# Patient Record
Sex: FEMALE | Race: BLACK OR AFRICAN AMERICAN | NOT HISPANIC OR LATINO | URBAN - METROPOLITAN AREA
[De-identification: names, ages, dates, MRNs, and addresses within clinical notes are randomized per-mention and may not be internally consistent; named-entity substitution may affect disease eponyms.]

---

## 2017-02-01 ENCOUNTER — INPATIENT (INPATIENT)
Facility: HOSPITAL | Age: 57
LOS: 0 days | Discharge: HOME | End: 2017-02-02
Attending: INTERNAL MEDICINE

## 2017-06-27 DIAGNOSIS — R07.9 CHEST PAIN, UNSPECIFIED: ICD-10-CM

## 2017-06-27 DIAGNOSIS — Z90.710 ACQUIRED ABSENCE OF BOTH CERVIX AND UTERUS: ICD-10-CM

## 2017-06-27 DIAGNOSIS — K21.9 GASTRO-ESOPHAGEAL REFLUX DISEASE WITHOUT ESOPHAGITIS: ICD-10-CM

## 2017-06-27 DIAGNOSIS — Z86.73 PERSONAL HISTORY OF TRANSIENT ISCHEMIC ATTACK (TIA), AND CEREBRAL INFARCTION WITHOUT RESIDUAL DEFICITS: ICD-10-CM

## 2017-06-27 DIAGNOSIS — F32.9 MAJOR DEPRESSIVE DISORDER, SINGLE EPISODE, UNSPECIFIED: ICD-10-CM

## 2017-06-27 DIAGNOSIS — H26.9 UNSPECIFIED CATARACT: ICD-10-CM

## 2017-06-27 DIAGNOSIS — H40.9 UNSPECIFIED GLAUCOMA: ICD-10-CM

## 2017-06-27 DIAGNOSIS — R07.89 OTHER CHEST PAIN: ICD-10-CM

## 2017-06-27 DIAGNOSIS — H52.4 PRESBYOPIA: ICD-10-CM

## 2017-06-27 DIAGNOSIS — I10 ESSENTIAL (PRIMARY) HYPERTENSION: ICD-10-CM

## 2019-05-09 ENCOUNTER — RESULT REVIEW (OUTPATIENT)
Age: 59
End: 2019-05-09

## 2019-12-24 ENCOUNTER — EMERGENCY (EMERGENCY)
Facility: HOSPITAL | Age: 59
LOS: 0 days | Discharge: HOME | End: 2019-12-25
Attending: EMERGENCY MEDICINE | Admitting: EMERGENCY MEDICINE
Payer: MEDICAID

## 2019-12-24 VITALS
DIASTOLIC BLOOD PRESSURE: 104 MMHG | SYSTOLIC BLOOD PRESSURE: 194 MMHG | HEART RATE: 85 BPM | RESPIRATION RATE: 18 BRPM | TEMPERATURE: 98 F | WEIGHT: 197.98 LBS
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
## 2019-12-24 DIAGNOSIS — M25.562 PAIN IN LEFT KNEE: ICD-10-CM
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
## 2019-12-24 DIAGNOSIS — M54.2 CERVICALGIA: ICD-10-CM
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
## 2019-12-24 DIAGNOSIS — M25.561 PAIN IN RIGHT KNEE: ICD-10-CM

## 2019-12-24 PROCEDURE — 99283 EMERGENCY DEPT VISIT LOW MDM: CPT

## 2019-12-24 RX ORDER — METHOCARBAMOL 500 MG/1
1000 TABLET, FILM COATED ORAL ONCE
Refills: 0 | Status: COMPLETED | OUTPATIENT
Start: 2019-12-24 | End: 2019-12-24

## 2019-12-24 RX ORDER — KETOROLAC TROMETHAMINE 30 MG/ML
15 SYRINGE (ML) INJECTION ONCE
Refills: 0 | Status: DISCONTINUED | OUTPATIENT
Start: 2019-12-24 | End: 2019-12-24

## 2019-12-24 RX ADMIN — METHOCARBAMOL 1000 MILLIGRAM(S): 500 TABLET, FILM COATED ORAL at 23:33

## 2019-12-24 NOTE — ED ADULT NURSE NOTE - NSIMPLEMENTINTERV_GEN_ALL_ED
Implemented All Universal Safety Interventions:  [place] to call system. Call bell, personal items and telephone within reach. Instruct patient to call for assistance. Room bathroom lighting operational. Non-slip footwear when patient is off stretcher. Physically safe environment: no spills, clutter or unnecessary equipment. Stretcher in lowest position, wheels locked, appropriate side rails in place.

## 2019-12-25 VITALS
OXYGEN SATURATION: 98 % | HEART RATE: 82 BPM | SYSTOLIC BLOOD PRESSURE: 158 MMHG | DIASTOLIC BLOOD PRESSURE: 89 MMHG | TEMPERATURE: 98 F | RESPIRATION RATE: 18 BRPM

## 2019-12-25 PROCEDURE — 73564 X-RAY EXAM KNEE 4 OR MORE: CPT | Mod: 26,50

## 2019-12-25 RX ORDER — IBUPROFEN 200 MG
1 TABLET ORAL
Qty: 20 | Refills: 0
Start: 2019-12-25 | End: 2019-12-29

## 2019-12-25 RX ORDER — METHOCARBAMOL 500 MG/1
2 TABLET, FILM COATED ORAL
Qty: 30 | Refills: 0
Start: 2019-12-25 | End: 2019-12-29

## 2019-12-25 NOTE — ED PROVIDER NOTE - OBJECTIVE STATEMENT
58 y/o F with PMH HTN, has not yet taken her BP meds today presents with b/l knee pain s/p mechanical fall tonight @ 530pm. +constant, throbbing, moderate, non-radiating. R>L . no head injury/LOC/blood thinner use. no other symptoms. +worse with movement, better with rest.

## 2019-12-25 NOTE — ED PROVIDER NOTE - PATIENT PORTAL LINK FT
You can access the FollowMyHealth Patient Portal offered by [facility] by registering at the following website: [email or web address]. By joining [employer]’s FollowMyHealth portal, you will also be able to view your health information using other applications (apps) compatible with our system.

## 2019-12-25 NOTE — ED PROVIDER NOTE - CLINICAL SUMMARY MEDICAL DECISION MAKING FREE TEXT BOX
59yF p/w MSK neck pain and b/l knee pain after fall.  Well appearing, comfortable, xrays w/o apparent traumatic injuries.  No open wounds.  ACE wrap/knee immobilizer provided and pt given meds for MSK pain.  She was counseled on expected worsening on her MSK pain, supportive care, o/p f/u, return precautions.

## 2019-12-25 NOTE — ED PROVIDER NOTE - NSFOLLOWUPINSTRUCTIONS_ED_ALL_ED_FT
How to Use a Knee Immobilizer  A knee immobilizer, also called a knee brace, is used to support and protect an injured or painful knee. You may also have to wear it after knee surgery. A knee brace keeps your knee from moving or bending while it is healing. Wear and remove your knee brace only as told by your doctor.    ImageIn general, your knee brace should:    Have straps, hooks, or tapes that fasten snugly around your leg.  Not feel too tight or too loose.    Follow these instructions at home:  While resting, raise (elevate) your leg above the level of your heart. Pillows can be used for support. Doing this reduces throbbing and helps with healing.  Loosen the brace if your toes tingle or if you notice other symptoms or signs that it is too tight, such as:    Swelling.  Numbness.  Color change in your foot or ankle.  More pain.    Keep the brace clean.  If the brace is not waterproof:    Do not let it get wet.  Cover it with a watertight covering when you take a bath or a shower.    If your doctor says that you may take off (remove) the brace:    Take it off before you take a bath or a shower.  Check for any skin irritation.  Use a towel to dry the area completely before you put the brace back on.    Contact a doctor if:  Your knee brace breaks or needs to be replaced.  You have more pain or swelling in your knee, foot, or ankle.  Your knee brace is not helping.  Your knee brace makes your knee pain worse.  Summary  A knee immobilizer, also called a knee brace, keeps your knee from moving or bending while it is healing.  Different knee braces will have different instructions for use. Follow the instructions from your doctor.  Call your doctor if you have more pain or swelling, if your knee brace breaks, or if it does not help your knee pain.  This information is not intended to replace advice given to you by your health care provider. Make sure you discuss any questions you have with your health care provider.     Cervical Radiculopathy  ImageCervical radiculopathy means that a nerve in the neck is pinched or bruised. This can cause pain or loss of feeling (numbness) that runs from your neck to your arm and fingers.    Follow these instructions at home:  Managing pain     Take over-the-counter and prescription medicines only as told by your doctor.  If directed, put ice on the injured or painful area.    Put ice in a plastic bag.  Place a towel between your skin and the bag.  Leave the ice on for 20 minutes, 2–3 times per day.    If ice does not help, you can try using heat. Take a warm shower or warm bath, or use a heat pack as told by your doctor.  You may try a gentle neck and shoulder massage.  Activity     Rest as needed. Follow instructions from your doctor about any activities to avoid.  Do exercises as told by your doctor or physical therapist.  General instructions     If you were given a soft collar, wear it as told by your doctor.  Use a flat pillow when you sleep.  Keep all follow-up visits as told by your doctor. This is important.  Contact a doctor if:  Your condition does not improve with treatment.  Get help right away if:  Your pain gets worse and is not controlled with medicine.  You lose feeling or feel weak in your hand, arm, face, or leg.  You have a fever.  You have a stiff neck.  You cannot control when you poop or pee (have incontinence).  You have trouble with walking, balance, or talking.  This information is not intended to replace advice given to you by your health care provider. Make sure you discuss any questions you have with your health care provider.

## 2019-12-25 NOTE — ED PROVIDER NOTE - CARE PROVIDER_API CALL
Riki Armstrong (MD)  Orthopaedic Surgery  [address] [place] [address]  Phone: [phone]  Fax: [phone]  Follow Up Time: 1-3 Days

## 2019-12-25 NOTE — ED PROVIDER NOTE - NSFOLLOWUPCLINICS_GEN_ALL_ED_FT
A Family Medicine Doctor  Family Medicine  .  [place]   Phone:   Fax:   Follow Up Time: 1-3 Days    [facility] ([facility])  Rehabilitation  [facility] [address] [place] [address]  Phone: [phone]  Fax:   Follow Up Time: 1-3 Days    [facility] ([facility])  Rehabilitation  [address] [place] [id number]  Phone: [phone]  Fax:   Follow Up Time: 1-3 Days

## 2019-12-25 NOTE — ED PROVIDER NOTE - PHYSICAL EXAMINATION
PHYSICAL EXAM:    GENERAL: Alert, appears stated age, well appearing, non-toxic  SKIN: Warm, pink and dry. MMM.   HEAD: NC, AT, no step offs   EYE: Normal lids/conjunctiva  ENT: Normal hearing, patent oropharynx   NECK: +supple. no TTP.   Pulm: Bilateral BS, normal resp effort, no wheezes, stridor, or retractions  CV: RRR, no M/R/G, 2+and = radial pulses  Abd: soft, non-tender, non-distended.   Mskel: no erythema, cyanosis, edema. +b/l medial knee TTP. FROM. 2+ DP pulses. no spinal TTP   Neuro: AAOx3, no sensory/motor deficits. 5/5 strength throughout. normal gait.

## 2019-12-25 NOTE — ED PROVIDER NOTE - NS ED ROS FT
Review of Systems    Constitutional: (-) fever   Eyes/ENT: (-) vision changes  Cardiovascular: (-) chest pain, (-) syncope (-) palpitations  Respiratory: (-) cough, (-) shortness of breath  Gastrointestinal: (-) vomiting, (-) diarrhea (-) abdominal pain  Musculoskeletal: (-) neck pain, (-) back pain, (+) leg pain  Integumentary: (-) rash, (-) edema  Neurological: (-) headache  Hematologic: (-) easy bruising   Psychiatric: (-) hallucinations  Allergic/Immunologic: (-) pruritus
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
## 2020-01-01 ENCOUNTER — OUTPATIENT (OUTPATIENT)
Dept: OUTPATIENT SERVICES | Facility: HOSPITAL | Age: 60
LOS: 1 days | End: 2020-01-01
Payer: MEDICAID

## 2020-01-03 DIAGNOSIS — Z71.89 OTHER SPECIFIED COUNSELING: ICD-10-CM

## 2020-02-10 DIAGNOSIS — Z76.89 PERSONS ENCOUNTERING HEALTH SERVICES IN OTHER SPECIFIED CIRCUMSTANCES: ICD-10-CM

## 2020-03-31 ENCOUNTER — INPATIENT (INPATIENT)
Facility: HOSPITAL | Age: 60
LOS: 2 days | Discharge: HOME | End: 2020-04-03
Attending: INTERNAL MEDICINE | Admitting: INTERNAL MEDICINE
Payer: MEDICAID

## 2020-03-31 VITALS
SYSTOLIC BLOOD PRESSURE: 124 MMHG | HEART RATE: 92 BPM | OXYGEN SATURATION: 98 % | TEMPERATURE: 99 F | RESPIRATION RATE: 18 BRPM | DIASTOLIC BLOOD PRESSURE: 63 MMHG

## 2020-03-31 DIAGNOSIS — U07.1 COVID-19: ICD-10-CM

## 2020-03-31 DIAGNOSIS — R55 SYNCOPE AND COLLAPSE: ICD-10-CM

## 2020-03-31 DIAGNOSIS — E86.0 DEHYDRATION: ICD-10-CM

## 2020-03-31 DIAGNOSIS — F39 UNSPECIFIED MOOD [AFFECTIVE] DISORDER: ICD-10-CM

## 2020-03-31 DIAGNOSIS — J12.89 OTHER VIRAL PNEUMONIA: ICD-10-CM

## 2020-03-31 DIAGNOSIS — E78.5 HYPERLIPIDEMIA, UNSPECIFIED: ICD-10-CM

## 2020-03-31 DIAGNOSIS — I10 ESSENTIAL (PRIMARY) HYPERTENSION: ICD-10-CM

## 2020-03-31 DIAGNOSIS — I95.9 HYPOTENSION, UNSPECIFIED: ICD-10-CM

## 2020-03-31 DIAGNOSIS — J96.01 ACUTE RESPIRATORY FAILURE WITH HYPOXIA: ICD-10-CM

## 2020-03-31 LAB
ALBUMIN SERPL ELPH-MCNC: 3.4 G/DL — LOW (ref 3.5–5.2)
ALP SERPL-CCNC: 53 U/L — SIGNIFICANT CHANGE UP (ref 30–115)
ALT FLD-CCNC: 19 U/L — SIGNIFICANT CHANGE UP (ref 0–41)
ANION GAP SERPL CALC-SCNC: 11 MMOL/L — SIGNIFICANT CHANGE UP (ref 7–14)
APPEARANCE UR: ABNORMAL
AST SERPL-CCNC: 28 U/L — SIGNIFICANT CHANGE UP (ref 0–41)
BACTERIA # UR AUTO: ABNORMAL
BASOPHILS # BLD AUTO: 0.02 K/UL — SIGNIFICANT CHANGE UP (ref 0–0.2)
BASOPHILS NFR BLD AUTO: 0.1 % — SIGNIFICANT CHANGE UP (ref 0–1)
BILIRUB SERPL-MCNC: 0.6 MG/DL — SIGNIFICANT CHANGE UP (ref 0.2–1.2)
BILIRUB UR-MCNC: NEGATIVE — SIGNIFICANT CHANGE UP
BUN SERPL-MCNC: 13 MG/DL — SIGNIFICANT CHANGE UP (ref 10–20)
CALCIUM SERPL-MCNC: 8.2 MG/DL — LOW (ref 8.5–10.1)
CHLORIDE SERPL-SCNC: 97 MMOL/L — LOW (ref 98–110)
CK SERPL-CCNC: 115 U/L — SIGNIFICANT CHANGE UP (ref 0–225)
CO2 SERPL-SCNC: 25 MMOL/L — SIGNIFICANT CHANGE UP (ref 17–32)
COLOR SPEC: YELLOW — SIGNIFICANT CHANGE UP
CREAT SERPL-MCNC: 1.2 MG/DL — SIGNIFICANT CHANGE UP (ref 0.7–1.5)
DIFF PNL FLD: NEGATIVE — SIGNIFICANT CHANGE UP
EOSINOPHIL # BLD AUTO: 0 K/UL — SIGNIFICANT CHANGE UP (ref 0–0.7)
EOSINOPHIL NFR BLD AUTO: 0 % — SIGNIFICANT CHANGE UP (ref 0–8)
EPI CELLS # UR: 14 /HPF — HIGH (ref 0–5)
GLUCOSE SERPL-MCNC: 211 MG/DL — HIGH (ref 70–99)
GLUCOSE UR QL: SIGNIFICANT CHANGE UP
HCT VFR BLD CALC: 37.5 % — SIGNIFICANT CHANGE UP (ref 37–47)
HGB BLD-MCNC: 13.6 G/DL — SIGNIFICANT CHANGE UP (ref 12–16)
HYALINE CASTS # UR AUTO: 7 /LPF — SIGNIFICANT CHANGE UP (ref 0–7)
IMM GRANULOCYTES NFR BLD AUTO: 2 % — HIGH (ref 0.1–0.3)
KETONES UR-MCNC: ABNORMAL
LDH SERPL L TO P-CCNC: 422 — HIGH (ref 50–242)
LEUKOCYTE ESTERASE UR-ACNC: NEGATIVE — SIGNIFICANT CHANGE UP
LYMPHOCYTES # BLD AUTO: 1.52 K/UL — SIGNIFICANT CHANGE UP (ref 1.2–3.4)
LYMPHOCYTES # BLD AUTO: 8.6 % — LOW (ref 20.5–51.1)
MCHC RBC-ENTMCNC: 28.2 PG — SIGNIFICANT CHANGE UP (ref 27–31)
MCHC RBC-ENTMCNC: 36.3 G/DL — SIGNIFICANT CHANGE UP (ref 32–37)
MCV RBC AUTO: 77.8 FL — LOW (ref 81–99)
MONOCYTES # BLD AUTO: 0.45 K/UL — SIGNIFICANT CHANGE UP (ref 0.1–0.6)
MONOCYTES NFR BLD AUTO: 2.5 % — SIGNIFICANT CHANGE UP (ref 1.7–9.3)
NEUTROPHILS # BLD AUTO: 15.3 K/UL — HIGH (ref 1.4–6.5)
NEUTROPHILS NFR BLD AUTO: 86.8 % — HIGH (ref 42.2–75.2)
NITRITE UR-MCNC: NEGATIVE — SIGNIFICANT CHANGE UP
NRBC # BLD: 0 /100 WBCS — SIGNIFICANT CHANGE UP (ref 0–0)
PH UR: 6 — SIGNIFICANT CHANGE UP (ref 5–8)
PLATELET # BLD AUTO: 124 K/UL — LOW (ref 130–400)
POTASSIUM SERPL-MCNC: 4.6 MMOL/L — SIGNIFICANT CHANGE UP (ref 3.5–5)
POTASSIUM SERPL-SCNC: 4.6 MMOL/L — SIGNIFICANT CHANGE UP (ref 3.5–5)
PROT SERPL-MCNC: 6.7 G/DL — SIGNIFICANT CHANGE UP (ref 6–8)
PROT UR-MCNC: ABNORMAL
RBC # BLD: 4.82 M/UL — SIGNIFICANT CHANGE UP (ref 4.2–5.4)
RBC # FLD: 13.8 % — SIGNIFICANT CHANGE UP (ref 11.5–14.5)
RBC CASTS # UR COMP ASSIST: 4 /HPF — SIGNIFICANT CHANGE UP (ref 0–4)
SODIUM SERPL-SCNC: 133 MMOL/L — LOW (ref 135–146)
SP GR SPEC: 1.02 — SIGNIFICANT CHANGE UP (ref 1.01–1.02)
TROPONIN T SERPL-MCNC: <0.01 NG/ML — SIGNIFICANT CHANGE UP
UROBILINOGEN FLD QL: ABNORMAL
WBC # BLD: 17.65 K/UL — HIGH (ref 4.8–10.8)
WBC # FLD AUTO: 17.65 K/UL — HIGH (ref 4.8–10.8)
WBC UR QL: 11 /HPF — HIGH (ref 0–5)

## 2020-03-31 PROCEDURE — 99285 EMERGENCY DEPT VISIT HI MDM: CPT

## 2020-03-31 PROCEDURE — 93010 ELECTROCARDIOGRAM REPORT: CPT

## 2020-03-31 PROCEDURE — 71045 X-RAY EXAM CHEST 1 VIEW: CPT | Mod: 26

## 2020-03-31 RX ORDER — HYDROXYCHLOROQUINE SULFATE 200 MG
200 TABLET ORAL EVERY 12 HOURS
Refills: 0 | Status: DISCONTINUED | OUTPATIENT
Start: 2020-04-01 | End: 2020-04-03

## 2020-03-31 RX ORDER — NIFEDIPINE 30 MG
60 TABLET, EXTENDED RELEASE 24 HR ORAL DAILY
Refills: 0 | Status: DISCONTINUED | OUTPATIENT
Start: 2020-03-31 | End: 2020-04-01

## 2020-03-31 RX ORDER — CEFTRIAXONE 500 MG/1
1000 INJECTION, POWDER, FOR SOLUTION INTRAMUSCULAR; INTRAVENOUS EVERY 24 HOURS
Refills: 0 | Status: DISCONTINUED | OUTPATIENT
Start: 2020-03-31 | End: 2020-04-03

## 2020-03-31 RX ORDER — SODIUM CHLORIDE 9 MG/ML
500 INJECTION INTRAMUSCULAR; INTRAVENOUS; SUBCUTANEOUS ONCE
Refills: 0 | Status: COMPLETED | OUTPATIENT
Start: 2020-03-31 | End: 2020-03-31

## 2020-03-31 RX ORDER — ACETAMINOPHEN 500 MG
650 TABLET ORAL EVERY 6 HOURS
Refills: 0 | Status: DISCONTINUED | OUTPATIENT
Start: 2020-03-31 | End: 2020-04-03

## 2020-03-31 RX ORDER — ONDANSETRON 8 MG/1
4 TABLET, FILM COATED ORAL ONCE
Refills: 0 | Status: COMPLETED | OUTPATIENT
Start: 2020-03-31 | End: 2020-03-31

## 2020-03-31 RX ORDER — ESCITALOPRAM OXALATE 10 MG/1
20 TABLET, FILM COATED ORAL DAILY
Refills: 0 | Status: DISCONTINUED | OUTPATIENT
Start: 2020-03-31 | End: 2020-04-03

## 2020-03-31 RX ORDER — HYDROXYCHLOROQUINE SULFATE 200 MG
TABLET ORAL
Refills: 0 | Status: DISCONTINUED | OUTPATIENT
Start: 2020-03-31 | End: 2020-04-03

## 2020-03-31 RX ORDER — METOPROLOL TARTRATE 50 MG
25 TABLET ORAL
Refills: 0 | Status: DISCONTINUED | OUTPATIENT
Start: 2020-03-31 | End: 2020-04-01

## 2020-03-31 RX ORDER — CHLORHEXIDINE GLUCONATE 213 G/1000ML
1 SOLUTION TOPICAL
Refills: 0 | Status: DISCONTINUED | OUTPATIENT
Start: 2020-03-31 | End: 2020-04-03

## 2020-03-31 RX ORDER — ATORVASTATIN CALCIUM 80 MG/1
10 TABLET, FILM COATED ORAL AT BEDTIME
Refills: 0 | Status: DISCONTINUED | OUTPATIENT
Start: 2020-03-31 | End: 2020-04-03

## 2020-03-31 RX ORDER — HYDROXYCHLOROQUINE SULFATE 200 MG
400 TABLET ORAL EVERY 12 HOURS
Refills: 0 | Status: COMPLETED | OUTPATIENT
Start: 2020-03-31 | End: 2020-04-01

## 2020-03-31 RX ORDER — ENOXAPARIN SODIUM 100 MG/ML
40 INJECTION SUBCUTANEOUS DAILY
Refills: 0 | Status: DISCONTINUED | OUTPATIENT
Start: 2020-03-31 | End: 2020-04-03

## 2020-03-31 RX ORDER — CEFTRIAXONE 500 MG/1
1000 INJECTION, POWDER, FOR SOLUTION INTRAMUSCULAR; INTRAVENOUS ONCE
Refills: 0 | Status: COMPLETED | OUTPATIENT
Start: 2020-03-31 | End: 2020-03-31

## 2020-03-31 RX ADMIN — Medication 650 MILLIGRAM(S): at 21:11

## 2020-03-31 RX ADMIN — Medication 400 MILLIGRAM(S): at 20:57

## 2020-03-31 RX ADMIN — ATORVASTATIN CALCIUM 10 MILLIGRAM(S): 80 TABLET, FILM COATED ORAL at 20:57

## 2020-03-31 RX ADMIN — SODIUM CHLORIDE 500 MILLILITER(S): 9 INJECTION INTRAMUSCULAR; INTRAVENOUS; SUBCUTANEOUS at 21:09

## 2020-03-31 RX ADMIN — CEFTRIAXONE 100 MILLIGRAM(S): 500 INJECTION, POWDER, FOR SOLUTION INTRAMUSCULAR; INTRAVENOUS at 19:06

## 2020-03-31 RX ADMIN — ONDANSETRON 4 MILLIGRAM(S): 8 TABLET, FILM COATED ORAL at 20:37

## 2020-03-31 NOTE — ED PROVIDER NOTE - PHYSICAL EXAMINATION
VITAL SIGNS: I have reviewed nursing notes and confirm.  CONSTITUTIONAL: appears stated age, no acute distress  SKIN: Warm dry, normal skin turgor  HEAD: NCAT  EYES: EOMI, PERRLA, no scleral icterus  ENT: Moist mucous membranes, normal pharynx   NECK: Supple; non tender.  CARD: RRR, no murmurs, rubs or gallops  RESP: clear to ausculation b/l.  No rales, rhonchi, or wheezing.  ABD: soft, + BS, non-tender, non-distended, no rebound or guarding.  EXT: Full ROM, no bony tenderness, no pedal edema, no calf tenderness  NEURO: normal motor. normal sensory. CN II-XII intact. Cerebellar testing normal.   PSYCH: Cooperative, appropriate.

## 2020-03-31 NOTE — H&P ADULT - NSHPPHYSICALEXAM_GEN_ALL_CORE
GEN: NAD, comfortable  CARDIO: RRR, no m/r/g  RESP: CTAB, no w/r/r  ABD: soft, NT/ND, +BS  EXT: no edema, pp b/l  NEURO: AAOx3, grossly normal Vital Signs Last 24 Hrs  T(C): 37.1 (01 Apr 2020 00:50), Max: 38.8 (31 Mar 2020 20:47)  T(F): 98.8 (01 Apr 2020 00:50), Max: 101.8 (31 Mar 2020 20:47)  HR: 100 (31 Mar 2020 20:47) (92 - 100)  BP: 101/64 (31 Mar 2020 20:47) (101/64 - 124/63)  BP(mean): 77 (31 Mar 2020 20:47) (77 - 77)  RR: 18 (31 Mar 2020 20:47) (17 - 20)  SpO2: 96% (31 Mar 2020 20:47) (93% - 98%)    GEN: NAD, comfortable  CARDIO: RRR, no m/r/g  RESP: CTAB, no w/r/r  ABD: soft, NT/ND, +BS  EXT: no edema, pp b/l  NEURO: AAOx3, grossly normal

## 2020-03-31 NOTE — H&P ADULT - NSICDXPASTMEDICALHX_GEN_ALL_CORE_FT
PAST MEDICAL HISTORY:  Hypertension, unspecified type PAST MEDICAL HISTORY:  HLD (hyperlipidemia)     Hypertension, unspecified type     Mood disorder

## 2020-03-31 NOTE — ED ADULT NURSE NOTE - CHIEF COMPLAINT QUOTE
Pt has syncopal episode today. Pt had second syncopal episode when EMS arrived and was hypotensive. Pt also c/o cough.

## 2020-03-31 NOTE — ED PROVIDER NOTE - CARE PLAN
Principal Discharge DX:	Syncope  Secondary Diagnosis:	Viral illness Principal Discharge DX:	Syncope  Secondary Diagnosis:	Viral illness  Secondary Diagnosis:	UTI (urinary tract infection)

## 2020-03-31 NOTE — ED PROVIDER NOTE - NS ED ROS FT
Constitutional: See HPI.  Eyes: No visual changes, eye pain or discharge. No Photophobia  ENMT: No hearing changes, pain, discharge or infections. No neck pain or stiffness. No limited ROM  Cardiac: No SOB or edema. No chest pain with exertion.  Respiratory: see hpi   GI: see hpi   : No dysuria, frequency or burning. No Discharge  MS: No myalgia, muscle weakness, joint pain or back pain.  Neuro: see hpi   Skin: No skin rash.  Except as documented in the HPI, all other systems are negative.

## 2020-03-31 NOTE — H&P ADULT - NSHPLABSRESULTS_GEN_ALL_CORE
13.6   17.65 )-----------( 124      ( 31 Mar 2020 16:00 )             37.5     03-31    133<L>  |  97<L>  |  13  ----------------------------<  211<H>  4.6   |  25  |  1.2    Ca    8.2<L>      31 Mar 2020 16:00    TPro  6.7  /  Alb  3.4<L>  /  TBili  0.6  /  DBili  x   /  AST  28  /  ALT  19  /  AlkPhos  53  0331    Urinalysis Basic - ( 31 Mar 2020 18:10 )    Color: Yellow / Appearance: Slightly Turbid / S.016 / pH: x  Gluc: x / Ketone: Small  / Bili: Negative / Urobili: 3 mg/dL   Blood: x / Protein: 30 mg/dL / Nitrite: Negative   Leuk Esterase: Negative / RBC: 4 /HPF / WBC 11 /HPF   Sq Epi: x / Non Sq Epi: 14 /HPF / Bacteria: Moderate    Lactate Trend    CARDIAC MARKERS ( 31 Mar 2020 16:00 )  x     / <0.01 ng/mL / 115 U/L / x     / x

## 2020-03-31 NOTE — ED PROVIDER NOTE - CLINICAL SUMMARY MEDICAL DECISION MAKING FREE TEXT BOX
Pt with h/o htn, hld with generalized weakness/malaise for 4 days, today lightheaded and syncopized - no injury from syncope.  + cough x 2 days.  O2 sat 92-94% on RA.  no increased WOB.  cxr - b/l hazy infiltrates.  covid swab sent.  wbc 17K.  ua + for UTI, given iv abx.  admitted for likely covid, hypoxia.

## 2020-03-31 NOTE — ED PROVIDER NOTE - ATTENDING CONTRIBUTION TO CARE
58 y/o female with h/o htn, hld, in ER for eval after episode of syncope earlier today. Pt states she felt nauseated and lightheaded and then passed out, witnessed by family.  Denies hitting head.  No seizure activity noted.  Pt c/o generalized weakness for past few days with loss of appetite.  + non-productive cough x 2 days.  Denies CP/SOB.  no f/c.  no abd pain.  no v/d.  no urinary symptoms.  No HA currently.   PE - nad, nc/at, eomi, perrl, op - clear, no c-spine tenderness, no resp distress, + speaking full sentences, cta b/l, no w/r/r, rrr, abd- soft, nt/nd, nabs, from x 4, A&O x 3, cn 2-12 intact, no motor/sensory deficits.  -check labs, cxr, ekg, nasal swab for covid.

## 2020-03-31 NOTE — ED ADULT NURSE NOTE - NSIMPLEMENTINTERV_GEN_ALL_ED
Implemented All Fall with Harm Risk Interventions:  [place] to call system. Call bell, personal items and telephone within reach. Instruct patient to call for assistance. Room bathroom lighting operational. Non-slip footwear when patient is off stretcher. Physically safe environment: no spills, clutter or unnecessary equipment. Stretcher in lowest position, wheels locked, appropriate side rails in place. Provide visual cue, wrist band, yellow gown, etc. Monitor gait and stability. Monitor for mental status changes and reorient to person, place, and time. Review medications for side effects contributing to fall risk. Reinforce activity limits and safety measures with patient and family. Provide visual clues: red socks.

## 2020-03-31 NOTE — H&P ADULT - ASSESSMENT
58 yo F with PMH of HTN and DLD presented after syncopal episode. Reports she has been having loss of appetite, loss of smell and malaise over past 4 days.    #) Cough + malaise + anosmia 2/2 suspected COVID-19  - CXR = b/l interstitial opacities  - COVID swab - sent, pending  - patient also with leukocytosis and +ve UA - will treat for UTI for now w/ Rocephin  - Tylenol PRN  - will start course of Plaquenil since patient hypoxic  - titrate supplemental oxygen  - f/u CRP + pro gabriella    #) HTN    #) DLD    DVT ppx: Lovenox subQ  Diet: DASH  Activity: AAT  Code status: FULL  Dispo: pending 58 yo F with PMH of HTN and DLD presented after syncopal episode. Also endorses loss of appetite, chills, and dry cough for past 2-3 days.    #) Cough + malaise 2/2 suspected COVID-19  - CXR = b/l interstitial opacities  - COVID swab - sent, pending  - patient also with leukocytosis and +ve UA - will treat as cystitis for now w/ Rocephin  - Tylenol PRN  - will start course of Plaquenil since patient hypoxic  - titrate supplemental oxygen  - f/u CRP + pro gabriella    #) Syncope?  - witnessed event, no post-ictal confusion  - possibly vagal mediated event 2/2 dehydration and underlying infection  - c/w tele monitoring, check orthostats  - cannot order echo at this time    #) HTN -     #) DLD -     DVT ppx: Lovenox subQ  Diet: DASH  Activity: AAT  Code status: FULL  Dispo: pending 60 yo F with PMH of HTN and DLD presented after syncopal episode. Also endorses loss of appetite, chills, and dry cough for past 2-3 days.    #) Cough + malaise 2/2 suspected COVID-19  - CXR = b/l interstitial opacities  - COVID swab - sent, pending  - Tylenol PRN  - will start course of Plaquenil since patient hypoxic  - titrate supplemental oxygen  - f/u CRP + pro gabriella    #) Syncope?  - witnessed event, no post-ictal confusion  - possibly vagal mediated event 2/2 dehydration/decreased PO intake and underlying infection  - c/w tele monitoring, check orthostats  - cannot order echo at this time    #) Acute cystitis  - +ve UA, leukocytosis w/ L-shift, does report urinary frequency  - will treat w/ Rocephin for now    #) HTN - c/w Procardia + Clonidine + BB (home meds)    #) DLD - c/w statin    #) Mood disorder - c/w SSRI    DVT ppx: Lovenox subQ  Diet: DASH  Activity: AAT  Code status: FULL  Dispo: pending 58 yo F with PMH of HTN and DLD presented after syncopal episode. Also endorses loss of appetite, chills, and dry cough for past 2-3 days.    #) Cough + malaise 2/2 suspected COVID-19  - CXR = b/l interstitial opacities  - COVID swab - sent, pending  - Tylenol PRN  - will start course of Plaquenil since patient hypoxic  - titrate supplemental oxygen  - f/u CRP + pro gabriella    #) Syncope?  - witnessed event, no post-ictal confusion  - possibly vagal mediated event 2/2 dehydration/decreased PO intake and underlying infection  - c/w tele monitoring, check orthostats  - cannot order echo at this time    #) UTI  - +ve UA, leukocytosis w/ L-shift, does report urinary frequency  - will treat w/ Rocephin for now    #) HTN - c/w Procardia + Clonidine + BB (home meds)    #) DLD - c/w statin    #) Mood disorder - c/w SSRI    DVT ppx: Lovenox subQ  Diet: DASH  Activity: AAT  Code status: FULL  Dispo: pending

## 2020-03-31 NOTE — H&P ADULT - HISTORY OF PRESENT ILLNESS
60 yo F with PMH of HTN and DLD presented after syncopal episode. Reports she has been having loss of appetite, loss of smell and malaise over past 4 days. Also endorses cough for past 2 days. Today reports she felt weak and lightheaded and proceeded to pass out. Denies head strike, event was witnessed by kids. No seizure like activity reported. Denies fevers, chills, N/V/C/D, or other complaints. No known COVID-19 contacts. No recent travel. 58 yo F with PMH of HTN and DLD presented after syncopal episode. Reports she was at home when she felt weak and lightheaded, proceeded to pass out. Denies head strike, event was witnessed by kids. No seizure like activity reported. Also endorses malaise, chills, and dry cough over past 2 days. Denies fevers, N/V/C/D, changes in taste or smell, dysuria, or other complaints. No known COVID-19 contacts. No recent travel. States she has history of syncope in past last event 2 years ago.
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
## 2020-03-31 NOTE — ED ADULT TRIAGE NOTE - CHIEF COMPLAINT QUOTE
Pt has syncopal episode today. Pt had second syncopal episode when EMS arrived and was hypotensive. Pt denies fever, cough. Pt has syncopal episode today. Pt had second syncopal episode when EMS arrived and was hypotensive. Pt also c/o cough.

## 2020-03-31 NOTE — H&P ADULT - NSHPPOADEEPVENOUSTHROMB_GEN_A_CORE
Patient:   NAVJOT MARTIN            MRN: [id number]            FIN: [id number]              Age:   80 years     Sex:  FEMALE     :  42   Associated Diagnoses:   None   Author:   YOVANI NIELSON         OPERATIVE REPORT       Primary Care Physician      Physician Name:  CRISTINO MOLINA  Specialty :  FAMILY PRACTICE    Consulting Physicians     Physician Name:  MATY FUENTES Speciality:  PHYSICAL MEDICINE/REHAB Consult Reason:  PMR Evaluation         DATE OF SURGERY: 2019    PREOPERATIVE DIAGNOSIS(ES): Grade 2–3 spondylolisthesis L5-S1, adult degenerative scoliosis, sagittal and coronal plane imbalance with medically intractable back and lower extremity pain    POSTOPERATIVE DIAGNOSIS(ES): Grade 2–3 spondylolisthesis L5-S1, adult degenerative scoliosis, sagittal and coronal plane imbalance with medically intractable back and lower extremity pain    OPERATION: Placement of segmental instrumentation T10-S1, placement of bilateral pelvic fixation, T10-S2/ilium posterior spinal fusion/arthrodesis using locally harvested autograft and morcellized allograft, L4-L5 transforaminal lumbar interbody fusion including placement of intervertebral mechanical device, L3-L4, L4-L5, and L5-S1 Giron-Arnold/posterior column osteotomies for generation of lordosis, L3-L5 laminectomy for cauda equina  decompression, T9-T10 spinous process tethering, use of fluoroscopy, use of navigation    SURGEON: Yovani Pizano MD    ASSISTANT: Cayetano Crisostomo NP who assisted with positioning and closure    ANESTHESIA: General endotracheal anesthesia    COMPLICATIONS: None    ESTIMATED BLOOD LOSS: Per Anesthesia flowsheet    INSTRUMENTATION: NuVasive reline    INDICATIONS: 80-year-old female with severe back and lower extremity pain.  She describes severe L5 and S1 radiculopathies with some evidence of neurogenic claudication.  She has had a previous laminectomy in her lower lumbar spine as well as in her upper lumbar  spine for separate instances of pain and weakness.  She is developed a progressive coronal and sagittal plane imbalance as was evidenced on her scoliosis x-rays.  She also has evidence  of severe stenosis in the setting of a grade 2–3 spondylolisthesis at L5-S1.  She tried a variety of nonoperative interventions without meaningful success.  After her failure of nonoperative therapy we discussed the possibility of surgical intervention.  We discussed the need to not only address her focal pathology in her lower lumbar spine but also address her scoliosis and create a durable long-term construct.  We discussed in detail the  risks, benefits, and alternatives to surgical intervention.  We discussed the medical, technical, and neurologic challenges associated with such an operation and answered all of the questions today.  After discussing all these things in detail the patient and her family requested that we proceed and therefore her informed consent was obtained.    DESCRIPTION OF PROCEDURE: The patient was identified in the preoperative holding area and all appropriate consents were signed.  She was then taken to the operating room by the anesthesia staff and placed under general tracheal anesthesia without difficulty.  All appropriate vascular access was then obtained.  She was then placed in a [facility] tongs and placed prone onto the open OSI Deven table.  She was placed into 10 pounds of  craniocervical traction.  All bony prominences were padded.  We then prepped and draped in usual sterile fashion.  We then performed a timeout and verify the antibiotics have been administered and we had baseline neuro monitoring data.  Everyone agreed to proceed.  We then made an incision in the midline and performed a subperiosteal dissection all the way out to the lateral aspect of the transverse processes from T10 down to the sacrum and  pelvis.  We then cleansed and denuded the spine as best as possible.  We then  turned our attention to placement of segmental instrumentation.  We began placing our pedicle screws using an open freehand technique.  We wanted to place screws at L3 and above in this fashion and then used navigation for the distal construct given the challenging degenerated aberrant anatomy.  We then a 5 to start point using anatomic landmarks in greatest our point  using a high-speed bur.  We then cannulated the pedicle using a curved Lenke probe and then verified intraosseous cannulation using a blunt hole probe.  We then aspirated bone marrow to mix with allograft.  We then placed the appropriate size screws.  We then attached the reference arc and brought O arm in the field.  We reviewed the O arm images for all of our placed instrumentation and then used navigation to place the remaining  instrumentation.  From L4 to the sacrum and pelvis we again identified the start point using anatomic landmarks and verify that with navigation.  We created*point used a high-speed bur and then cannulated the pedicle using a navigated tap.  We then verified intraosseous cannulation using a blunt hole probe and then placed the appropriate size screw again using navigation.  Once all the screws were in from T10-S1 we then turned our attention to  our pelvic fixation.  We wanted to place a S2 AI screws and therefore we then identified the start point using anatomic landmarks and then again verified that with navigation.  We then cannulated the sacrum and ilium using a navigated tap and again verified intraosseous cannulation.  We then placed the appropriate size screw.  We did this first on the right side and ultimate the left side.  We then turned our attention to our decompression as  well as our osteotomies.  We began with the Smith-Arnold/posterior column osteotomies at L5-S1.  We used a high-speed bur to first create the inferior facetectomy and ultimately removed the superior facet using a high-speed bur.  We did this  first at L5-S1 and then at L4-L5 and then at L3-L4 releasing each of these levels in order to mobilize the spine as best as possible.  Once the Giron-Arnold osteotomies were complete we then turned our  attention to our decompression.  We used a combination of a high-speed bur as well as a Leksell Man in order to remove the laminas of L3, L4, and L5.  We wanted to completely decompress the cauda equina and verify that the lateral recess was nice and wide open.  We are able to follow out the nerve roots nicely to make sure that they were open not only in the central canal, lateral recess, as well as foramen.  Once those were nicely  decompressed we knew that we had decompressed the cauda equina nicely.  We kept all that autograft for arthrodesis.  We then distracted slightly at the L4-L5 level from the left side and identified the L4-L5 disc space.  We protected the exiting L4 nerve root as well as the traversing L5 nerve root and incised the disc using a 15 blade knife.  We then completed our discectomy using combination of a disc shaver as well as a curette and a  pituitary rongeur.  We then prepped the endplates as best as possible and trialed the appropriate size graft.  We then placed additional autograft in the anterior aspect of this space and impacted that as best as possible.  We then filled the titanium cage with autograft and then placed at the disc space.  We positioned it as best as possible.  We then backfilled the disc space using additional autograft.  We then remove the distraction and  turned our attention to our correction.  We placed a temporary iris on the left side from L2-L4 and distracted slightly to correct some of the scoliosis.  We then measured and contoured a iris for the right side.  We then used a combination of segmental reduction and cantilever bending in order to correct some of the scoliosis and attached the iris.  We then cut and contoured a iris for the left side after remove the  temporary iris on the left side.   We then used a combination of segmental reduction and cantilever bending as well as some in situ contouring in order to attach the iris from T10 down to the sacrum and pelvis on the left side as well.  We provisionally tightened everything using set screws and ultimately a torque/counter torque device.  We then brought x-ray into the field and reviewed our long cassette films.  Overall we are happy with the alignment and all of her  instrumentation and final tightened everything using a torque/counter torque device.  We then cleansed the wound as best as possible using pulse lavage irrigation.  We then obtained hemostasis and turned our attention to decortication.  We decorticated the posterior lateral gutter as well as the remaining lamina from T10 down to S2/ilium.  We then placed all of our autograft and allograft as well as BMP in the posterior lateral gutter in order  to optimize her conditions for ultimate fusion.  We then placed an additional iris on the right side in order to add stiffness across the lumbosacral junction or lower lumbar construct.  We then wanted to minimize the risk of proximal junctional kyphosis and therefore we added a T9-T10 spinous process tether.  We identified the base of the T9 spinous process and drilled a hole through it.  We then passed the tether through it and then ultimately  attached it to the iris connectors and tensioned it appropriately.  We final tightened that and then turned our attention to closure.  We obtained hemostasis and then placed 1 g of vancomycin powder in the wound.  We placed a deep drain and tunneled out the skin.  We have treated the soft tissue with quarter percent Marcaine with epinephrine for postoperative pain control.  We then closed the wound in layers including the muscle layer and the  fascial layer using interrupted 0 Vicryl sutures and ultimately the dermal using interrupted 2-0 Vicryl sutures.  We then approximate  the skin using a running subcuticular stitch and sealed the skin using Dermabond and Steri-Strips.  We secured the drain in place using 3-0 nylon suture in place sterile dressing on the wound.  The patient was then placed supine on the hospital bed and the Mckeon-Wells tongs removed.  Overall the patient  tolerated well without difficulty.    All sponge, needle, and instrument counts were correct throughout procedure.    All neuromonitoring data was stable throughout this procedure.              This dictation was created using Dragon voice recognition software and thus transcription errors or variance may occur.    LATESHA MACK M.D.   no

## 2020-03-31 NOTE — ED PROVIDER NOTE - OBJECTIVE STATEMENT
60 y/o F pmhx of HTN, HLD p/w syncope. Patient states has had loss of appetite and sense of taste along with malaise x 4 days, weakness and lightheadedness w/ preceding nausea prior to synopsizing at home, denies hitting head, witnessed by kids, no convulsing state as per patient, no urinary incontinence or tongue biting, no fevers/chills, admits to non-productive cough x 2 days. no other complaints. no chest pain or shortness of breath.

## 2020-03-31 NOTE — H&P ADULT - ATTENDING COMMENTS
60 YO F with a PMH of HTN and DLD who presents to the hospital with a c/o experiencing a witnessed syncopal episode at home. + LOC, - head trauma. No seizure activity. Endorses preceding symptom of light-headedness, denies any palpitations, CP, focal weakness, or headaches. Associated with non-productive cough and fevers. In the ED, a Chest X-Ray showed B/L reticulonodular airspace opacities. Pt was febrile (101.3) with hypoxia, placed on NC. Started on IV ABXs (Ceftriaxone). Isolated and COVID19 swab sent.     Physical exam shows pt in NAD. VSS, currently afebrile, not hypoxic on 3L NC. A&Ox3. Non-focal neuro exam. Muscle strength/sensation intact. CTA B/L with no W/C/R. RRR, no M/G/R. ABD is soft and non-tender, normoactive BSs. LEs without swelling. No rashes. Labs and radiology as above. QTc 470    Fevers + Cough likely due to viral respiratory syndrome, needs COVID19 rule out. - Recent travel. - COVID19 contact. Admit to COVID19 isolation unit. COVID19 swab sent. Isolate pt. Send CRP and procal. Trend CBC, CK, and LFTs. Does not currently qualify for hydroxychloroquine (need CXR changes + acute hypoxic respiratory failure requiring supplemental O2). IV ABxs (Ceftriaxone/Azithro). Vitamin C/Zinc. IVFs (LR). APAP PRN. Anti-tussives PRN. Supplemental O2 PRN. C/w statins, if LFTs are not > 75. No NSAIDs. Prone patient regularly. Serial EKGs.     Thrombocytopenia, from above. Pt denies any signs of bleeding. Management as above.     Syncopal episode, likely vasovagal is setting of viral syndrome. Doubt cardiac/seizure. Telem admit. Serial cardiac enzymes and EKGs. Fall precautions.     WALI vs CKD2, likely pre-renal; Doubt ATN. Send UA, urine lytes, urine eosinophils, serum phos, and trend BMP. IVFs (LR). Monitor urinary out-put.     Asymptomatic bacteriuria. Will treat for now.     Hx of HTN and DLD. Restart home meds, hold nephrotoxic agents. GI and DVT PPX. Inform PCP of pt's admission to hospital. Fall precautions. Rest as per above note.

## 2020-04-01 LAB
CRP SERPL-MCNC: 17.54 MG/DL — HIGH (ref 0–0.4)
CULTURE RESULTS: SIGNIFICANT CHANGE UP
FERRITIN SERPL-MCNC: 948 NG/ML — HIGH (ref 15–150)
PROCALCITONIN SERPL-MCNC: 0.77 NG/ML — HIGH (ref 0.02–0.1)
SARS-COV-2 RNA SPEC QL NAA+PROBE: DETECTED
SPECIMEN SOURCE: SIGNIFICANT CHANGE UP

## 2020-04-01 PROCEDURE — 99223 1ST HOSP IP/OBS HIGH 75: CPT | Mod: AI

## 2020-04-01 RX ORDER — FAMOTIDINE 10 MG/ML
20 INJECTION INTRAVENOUS DAILY
Refills: 0 | Status: DISCONTINUED | OUTPATIENT
Start: 2020-04-01 | End: 2020-04-03

## 2020-04-01 RX ADMIN — Medication 200 MILLIGRAM(S): at 20:39

## 2020-04-01 RX ADMIN — FAMOTIDINE 20 MILLIGRAM(S): 10 INJECTION INTRAVENOUS at 06:35

## 2020-04-01 RX ADMIN — Medication 400 MILLIGRAM(S): at 09:37

## 2020-04-01 RX ADMIN — ESCITALOPRAM OXALATE 20 MILLIGRAM(S): 10 TABLET, FILM COATED ORAL at 12:47

## 2020-04-01 RX ADMIN — CEFTRIAXONE 100 MILLIGRAM(S): 500 INJECTION, POWDER, FOR SOLUTION INTRAMUSCULAR; INTRAVENOUS at 18:00

## 2020-04-01 RX ADMIN — ATORVASTATIN CALCIUM 10 MILLIGRAM(S): 80 TABLET, FILM COATED ORAL at 20:39

## 2020-04-01 RX ADMIN — Medication 25 MILLIGRAM(S): at 04:47

## 2020-04-01 NOTE — ED ADULT NURSE REASSESSMENT NOTE - COMFORT CARE
assisted to bedpan/then cleaned after and changed with no linens provided. assisted to bedpan/then cleaned after and changed with linens provided.

## 2020-04-01 NOTE — ED ADULT NURSE REASSESSMENT NOTE - NS ED NURSE REASSESS COMMENT FT1
Hourly rounding performed with enhanced supervision.
Pt due for Metoprolol 25mg, Nifidipine 60mg and Clonidine 0.1mg PO at 6am with BP of 117/72 P-88. Pt was brought in yesterday after synopsizing twice. Once at home then in ambulance with BP in the 70s systolically. MD 300Chantal said to hold clonidine and nifidipine. Will continue to monitor.
Pt with vitals 6:50am of BP 89/54, P 83, R-17 98% on 2L nc so legs elevated and MD [id number] called who said they would access the patients chart about potential orders. Will continue to monitor. After elevating legs BP up to 104/55 P-84.
Oxygen

## 2020-04-01 NOTE — PROGRESS NOTE ADULT - SUBJECTIVE AND OBJECTIVE BOX
BRAYDEN MARTINO 59y Female  MRN#: [id number]   CODE STATUS:___full_____      SUBJECTIVE  Patient is a 59y old Female who presents with a chief complaint of syncope (31 Mar 2020 18:33)  Currently admitted to medicine with the primary diagnosis of Syncope  Hospital course has been complicated by hypotension  Today is hospital day 1d, and this morning she is feeling much better, had mild SOB but feels 'improved' and reports no acute complaints.   Overnight pt's BP was noted to be low, was given a bolus. This AM I held all BP meds.       OBJECTIVE  PAST MEDICAL & SURGICAL HISTORY  Mood disorder  HLD (hyperlipidemia)  Hypertension, unspecified type    ALLERGIES:  No Known Allergies    MEDICATIONS:  STANDING MEDICATIONS  atorvastatin 10 milliGRAM(s) Oral at bedtime  cefTRIAXone   IVPB 1000 milliGRAM(s) IV Intermittent every 24 hours  chlorhexidine 4% Liquid 1 Application(s) Topical <User Schedule>  enoxaparin Injectable 40 milliGRAM(s) SubCutaneous daily  escitalopram 20 milliGRAM(s) Oral daily  famotidine    Tablet 20 milliGRAM(s) Oral daily  hydroxychloroquine 200 milliGRAM(s) Oral every 12 hours  hydroxychloroquine   Oral     PRN MEDICATIONS  acetaminophen   Tablet .. 650 milliGRAM(s) Oral every 6 hours PRN  acetaminophen   Tablet .. 650 milliGRAM(s) Oral every 6 hours PRN      VITAL SIGNS: Last 24 Hours  T(C): 37.7 (2020 06:50), Max: 38.8 (31 Mar 2020 20:47)  T(F): 99.8 (2020 06:50), Max: 101.8 (31 Mar 2020 20:47)  HR: 83 (2020 07:10) (83 - 100)  BP: 104/55 (2020 07:10) (89/54 - 124/63)  BP(mean): 72 (2020 07:10) (72 - 79)  RR: 18 (2020 07:10) (16 - 20)  SpO2: 98% (2020 07:10) (93% - 99%)    LABS:                        13.6   17.65 )-----------( 124      ( 31 Mar 2020 16:00 )             37.5     03-31    133<L>  |  97<L>  |  13  ----------------------------<  211<H>  4.6   |  25  |  1.2    Ca    8.2<L>      31 Mar 2020 16:00    TPro  6.7  /  Alb  3.4<L>  /  TBili  0.6  /  DBili  x   /  AST  28  /  ALT  19  /  AlkPhos  53        Urinalysis Basic - ( 31 Mar 2020 18:10 )    Color: Yellow / Appearance: Slightly Turbid / S.016 / pH: x  Gluc: x / Ketone: Small  / Bili: Negative / Urobili: 3 mg/dL   Blood: x / Protein: 30 mg/dL / Nitrite: Negative   Leuk Esterase: Negative / RBC: 4 /HPF / WBC 11 /HPF   Sq Epi: x / Non Sq Epi: 14 /HPF / Bacteria: Moderate        Creatine Kinase, Serum: 115 U/L (20 @ 16:00)  Troponin T, Serum: <0.01 ng/mL (20 @ 16:00)      CARDIAC MARKERS ( 31 Mar 2020 16:00 )  x     / <0.01 ng/mL / 115 U/L / x     / x          RADIOLOGY:      PHYSICAL EXAM:    GENERAL: NAD, well-developed AAF comfortable, AAOx3  HEENT:  Atraumatic, Normocephalic. EOMI, PERRLA, conjunctiva and sclera clear, No JVD  PULMONARY: Clear to auscultation bilaterally; No wheeze/crackles appreciated though exam but limited by habitus/positioning  CARDIOVASCULAR: Regular rate and rhythm; No murmurs  GASTROINTESTINAL: Soft, Nontender, Nondistended; Bowel sounds normoactive  MUSCULOSKELETAL:  2+ Peripheral Pulses, No periph edema  NEUROLOGY: non-focal  SKIN: No rashes      ADMISSION SUMMARY  Patient is a 59y old Female who presents with a chief complaint of syncope (31 Mar 2020 18:33)  Currently admitted to medicine with the primary diagnosis of Syncope  60 yo F with PMH of HTN and DLD presented after syncopal episode. Reports she was at home when she felt weak and lightheaded, proceeded to pass out. Denies head strike, event was witnessed by kids. No seizure like activity reported. Also endorses malaise, chills, and dry cough over past 2 days. Denies fevers, N/V/C/D, changes in taste or smell, dysuria, or other complaints. No known COVID-19 contacts. No recent travel. States she has history of syncope in past last event 2 years ago.      ASSESSMENT & PLAN  60 yo F with PMH of HTN and DLD presented after syncopal episode. Also endorses loss of appetite, chills, and dry cough for past 2-3 days.    #) Cough + malaise due to suspected COVID-19  #) other possible underlying infection  - CXR: b/l interstitial opacities  - no hypoxia, 99% on RA but on 2L for pt comfort 99%.   - however WBC 17 w/ left shift, no leukopenia  - UA neg (no leuko/nitrile)  - f/u COVID19  - f/u BCx, UCx  - Tylenol PRN  - started on plaquanil & ceftriaxone (possible bacterial CAP or UTI?)  - QTc 470, EP consult placed  - titrate supplemental oxygen  - CRP 17.5, procal 0.77,   - ID consult    #) Syncope, witnessed  #) Hypotension   - possibly vagal mediated event due to dehydration/decreased PO intake and underlying infection, orthostatic hypotension   - BP now 80/40s overnight, s/p bolus  - holding CLONIDINE, metoprolol, nifedipine (watch for rebound HTN)      #) HTN  - hypotensive; HOLD Procardia + Clonidine + BB (home meds)    #) DLD - c/w statin  #) Mood disorder - c/w SSRI    #MISC  - DVT ppx: Lovenox subQ  - Diet: DASH  - Activity: AAT  - Code status: FULL    (x) Discussion with patient and/or family regarding goals of care  (x) Discussed Case and Plan with Medical Attending, Name: Yomi BRAYDEN MARTINO 59y Female  MRN#: [id number]   CODE STATUS:___full_____      SUBJECTIVE  Patient is a 59y old Female who presents with a chief complaint of syncope (31 Mar 2020 18:33)  Currently admitted to medicine with the primary diagnosis of Syncope  Hospital course has been complicated by hypotension  Today is hospital day 1d, and this morning she is feeling much better, had mild SOB but feels 'improved' and reports no acute complaints.   Overnight pt's BP was noted to be low, was given a bolus. This AM I held all BP meds.       OBJECTIVE  PAST MEDICAL & SURGICAL HISTORY  Mood disorder  HLD (hyperlipidemia)  Hypertension, unspecified type    ALLERGIES:  No Known Allergies    MEDICATIONS:  STANDING MEDICATIONS  atorvastatin 10 milliGRAM(s) Oral at bedtime  cefTRIAXone   IVPB 1000 milliGRAM(s) IV Intermittent every 24 hours  chlorhexidine 4% Liquid 1 Application(s) Topical <User Schedule>  enoxaparin Injectable 40 milliGRAM(s) SubCutaneous daily  escitalopram 20 milliGRAM(s) Oral daily  famotidine    Tablet 20 milliGRAM(s) Oral daily  hydroxychloroquine 200 milliGRAM(s) Oral every 12 hours  hydroxychloroquine   Oral     PRN MEDICATIONS  acetaminophen   Tablet .. 650 milliGRAM(s) Oral every 6 hours PRN  acetaminophen   Tablet .. 650 milliGRAM(s) Oral every 6 hours PRN      VITAL SIGNS: Last 24 Hours  T(C): 37.7 (2020 06:50), Max: 38.8 (31 Mar 2020 20:47)  T(F): 99.8 (2020 06:50), Max: 101.8 (31 Mar 2020 20:47)  HR: 83 (2020 07:10) (83 - 100)  BP: 104/55 (2020 07:10) (89/54 - 124/63)  BP(mean): 72 (2020 07:10) (72 - 79)  RR: 18 (2020 07:10) (16 - 20)  SpO2: 98% (2020 07:10) (93% - 99%)    LABS:                        13.6   17.65 )-----------( 124      ( 31 Mar 2020 16:00 )             37.5     03-31    133<L>  |  97<L>  |  13  ----------------------------<  211<H>  4.6   |  25  |  1.2    Ca    8.2<L>      31 Mar 2020 16:00    TPro  6.7  /  Alb  3.4<L>  /  TBili  0.6  /  DBili  x   /  AST  28  /  ALT  19  /  AlkPhos  53        Urinalysis Basic - ( 31 Mar 2020 18:10 )    Color: Yellow / Appearance: Slightly Turbid / S.016 / pH: x  Gluc: x / Ketone: Small  / Bili: Negative / Urobili: 3 mg/dL   Blood: x / Protein: 30 mg/dL / Nitrite: Negative   Leuk Esterase: Negative / RBC: 4 /HPF / WBC 11 /HPF   Sq Epi: x / Non Sq Epi: 14 /HPF / Bacteria: Moderate        Creatine Kinase, Serum: 115 U/L (20 @ 16:00)  Troponin T, Serum: <0.01 ng/mL (20 @ 16:00)      CARDIAC MARKERS ( 31 Mar 2020 16:00 )  x     / <0.01 ng/mL / 115 U/L / x     / x          RADIOLOGY:  There are bilateral reticular and airspace opacities at both lung bases.     PHYSICAL EXAM:    GENERAL: NAD, well-developed AAF comfortable, AAOx3  HEENT:  Atraumatic, Normocephalic. EOMI, PERRLA, conjunctiva and sclera clear, No JVD  PULMONARY: Clear to auscultation bilaterally; No wheeze/crackles appreciated though exam but limited by habitus/positioning  CARDIOVASCULAR: Regular rate and rhythm; No murmurs  GASTROINTESTINAL: Soft, Nontender, Nondistended; Bowel sounds normoactive  MUSCULOSKELETAL:  2+ Peripheral Pulses, No periph edema  NEUROLOGY: non-focal  SKIN: No rashes    ADMISSION SUMMARY  Patient is a 59y old Female who presents with a chief complaint of syncope (31 Mar 2020 18:33)  Currently admitted to medicine with the primary diagnosis of Syncope  58 yo F with PMH of HTN and DLD presented after syncopal episode. Reports she was at home when she felt weak and lightheaded, proceeded to pass out. Denies head strike, event was witnessed by kids. No seizure like activity reported. Also endorses malaise, chills, and dry cough over past 2 days. Denies fevers, N/V/C/D, changes in taste or smell, dysuria, or other complaints. No known COVID-19 contacts. No recent travel. States she has history of syncope in past last event 2 years ago.      ASSESSMENT & PLAN  58 yo F with PMH of HTN and DLD presented after syncopal episode. Also endorses loss of appetite, chills, and dry cough for past 2-3 days.    #) Cough + malaise due to suspected COVID-19  #) other possible underlying infection  - CXR: b/l interstitial opacities  - no hypoxia, 99% on RA but on 2L for pt comfort 99%.   - however WBC 17 w/ left shift, no leukopenia  - UA neg (no leuko/nitrile)  - f/u COVID19  - f/u BCx, UCx  - Tylenol PRN  - started on plaquanil & ceftriaxone (possible bacterial CAP or UTI?)  - QTc 470, EP consult placed  - titrate supplemental oxygen  - CRP 17.5, procal 0.77,   - ID consult    #) Syncope, witnessed  #) Hypotension   - possibly vagal mediated event due to dehydration/decreased PO intake and underlying infection, orthostatic hypotension   - BP now 80/40s overnight, s/p bolus  - holding CLONIDINE, metoprolol, nifedipine (watch for rebound HTN)      #) HTN  - hypotensive; HOLD Procardia + Clonidine + BB (home meds)    #) DLD - c/w statin  #) Mood disorder - c/w SSRI    #MISC  - DVT ppx: Lovenox subQ  - Diet: DASH  - Activity: AAT  - Code status: FULL    (x) Discussion with patient and/or family regarding goals of care    Attending Attestation   Pt has been seen and examined. She is doing better than yesterday and this am.  She is on oxygen and breathing comfortably 3L NC 93% at Rest.   Admitted for SARS-CoV-2 Infection with Acute Hypoxic Respiratory Failure and UTI  Hypotension potentiated by BP Meds / Dehydration and Acute Infection additively / Doubt Shock from COVID19  c/w Hydroxychloroquine as above / Oxygen support / Ceftriaxone / f/u Ucxs  f/u EKG and watch closely QT Interval as she's on upper limit of normal for female. Check one in AM.   Pt eating well no need for IVF   Hold BP Meds / Monitor BP   Triple Isolation Precautions     Vital Signs Last 24 Hrs  T(C): 37.1 (2020 20:47), Max: 37.7 (2020 06:50)  T(F): 98.8 (2020 20:47), Max: 99.8 (2020 06:50)  HR: 82 (2020 20:47) (80 - 88)  BP: 120/74 (2020 20:47) (89/54 - 120/74)  BP(mean): 72 (2020 07:10) (72 - 79)  RR: 18 (2020 20:47) (16 - 18)  SpO2: 98% (2020 20:47) (98% - 99%)    Dispo: Acute / BRAYDEN MARTINO 59y Female  MRN#: [id number]   CODE STATUS:___full_____      SUBJECTIVE  Patient is a 59y old Female who presents with a chief complaint of syncope (31 Mar 2020 18:33)  Currently admitted to medicine with the primary diagnosis of Syncope  Hospital course has been complicated by hypotension  Today is hospital day 1d, and this morning she is feeling much better, had mild SOB but feels 'improved' and reports no acute complaints.   Overnight pt's BP was noted to be low, was given a bolus. This AM I held all BP meds.       OBJECTIVE  PAST MEDICAL & SURGICAL HISTORY  Mood disorder  HLD (hyperlipidemia)  Hypertension, unspecified type    ALLERGIES:  No Known Allergies    MEDICATIONS:  STANDING MEDICATIONS  atorvastatin 10 milliGRAM(s) Oral at bedtime  cefTRIAXone   IVPB 1000 milliGRAM(s) IV Intermittent every 24 hours  chlorhexidine 4% Liquid 1 Application(s) Topical <User Schedule>  enoxaparin Injectable 40 milliGRAM(s) SubCutaneous daily  escitalopram 20 milliGRAM(s) Oral daily  famotidine    Tablet 20 milliGRAM(s) Oral daily  hydroxychloroquine 200 milliGRAM(s) Oral every 12 hours  hydroxychloroquine   Oral     PRN MEDICATIONS  acetaminophen   Tablet .. 650 milliGRAM(s) Oral every 6 hours PRN  acetaminophen   Tablet .. 650 milliGRAM(s) Oral every 6 hours PRN      VITAL SIGNS: Last 24 Hours  T(C): 37.7 (2020 06:50), Max: 38.8 (31 Mar 2020 20:47)  T(F): 99.8 (2020 06:50), Max: 101.8 (31 Mar 2020 20:47)  HR: 83 (2020 07:10) (83 - 100)  BP: 104/55 (2020 07:10) (89/54 - 124/63)  BP(mean): 72 (2020 07:10) (72 - 79)  RR: 18 (2020 07:10) (16 - 20)  SpO2: 98% (2020 07:10) (93% - 99%)    LABS:                        13.6   17.65 )-----------( 124      ( 31 Mar 2020 16:00 )             37.5     03-31    133<L>  |  97<L>  |  13  ----------------------------<  211<H>  4.6   |  25  |  1.2    Ca    8.2<L>      31 Mar 2020 16:00    TPro  6.7  /  Alb  3.4<L>  /  TBili  0.6  /  DBili  x   /  AST  28  /  ALT  19  /  AlkPhos  53        Urinalysis Basic - ( 31 Mar 2020 18:10 )    Color: Yellow / Appearance: Slightly Turbid / S.016 / pH: x  Gluc: x / Ketone: Small  / Bili: Negative / Urobili: 3 mg/dL   Blood: x / Protein: 30 mg/dL / Nitrite: Negative   Leuk Esterase: Negative / RBC: 4 /HPF / WBC 11 /HPF   Sq Epi: x / Non Sq Epi: 14 /HPF / Bacteria: Moderate        Creatine Kinase, Serum: 115 U/L (20 @ 16:00)  Troponin T, Serum: <0.01 ng/mL (20 @ 16:00)      CARDIAC MARKERS ( 31 Mar 2020 16:00 )  x     / <0.01 ng/mL / 115 U/L / x     / x          RADIOLOGY:  There are bilateral reticular and airspace opacities at both lung bases.     PHYSICAL EXAM:    GENERAL: NAD, well-developed AAF comfortable, AAOx3  HEENT:  Atraumatic, Normocephalic. EOMI, PERRLA, conjunctiva and sclera clear, No JVD  PULMONARY: Clear to auscultation bilaterally; No wheeze/crackles appreciated though exam but limited by habitus/positioning  CARDIOVASCULAR: Regular rate and rhythm; No murmurs  GASTROINTESTINAL: Soft, Nontender, Nondistended; Bowel sounds normoactive  MUSCULOSKELETAL:  2+ Peripheral Pulses, No periph edema  NEUROLOGY: non-focal  SKIN: No rashes    ADMISSION SUMMARY  Patient is a 59y old Female who presents with a chief complaint of syncope (31 Mar 2020 18:33)  Currently admitted to medicine with the primary diagnosis of Syncope  60 yo F with PMH of HTN and DLD presented after syncopal episode. Reports she was at home when she felt weak and lightheaded, proceeded to pass out. Denies head strike, event was witnessed by kids. No seizure like activity reported. Also endorses malaise, chills, and dry cough over past 2 days. Denies fevers, N/V/C/D, changes in taste or smell, dysuria, or other complaints. No known COVID-19 contacts. No recent travel. States she has history of syncope in past last event 2 years ago.      ASSESSMENT & PLAN  60 yo F with PMH of HTN and DLD presented after syncopal episode. Also endorses loss of appetite, chills, and dry cough for past 2-3 days.    #) Cough + malaise due to suspected COVID-19  #) other possible underlying infection  - CXR: b/l interstitial opacities  - no hypoxia, 99% on RA but on 2L for pt comfort 99%.   - however WBC 17 w/ left shift, no leukopenia  - UA neg (no leuko/nitrile)  - f/u COVID19  - f/u BCx, UCx  - Tylenol PRN  - started on plaquanil & ceftriaxone (possible bacterial CAP or UTI?)  - QTc 470, EP consult placed  - titrate supplemental oxygen  - CRP 17.5, procal 0.77,   - ID consult    #) Syncope, witnessed  #) Hypotension   - possibly vagal mediated event due to dehydration/decreased PO intake and underlying infection, orthostatic hypotension   - BP now 80/40s overnight, s/p bolus  - holding CLONIDINE, metoprolol, nifedipine (watch for rebound HTN)      #) HTN  - hypotensive; HOLD Procardia + Clonidine + BB (home meds)    #) DLD - c/w statin  #) Mood disorder - c/w SSRI    #MISC  - DVT ppx: Lovenox subQ  - Diet: DASH  - Activity: AAT  - Code status: FULL    (x) Discussion with patient and/or family regarding goals of care    Attending Attestation   Pt has been seen and examined. She is doing better than yesterday and this am.  She is on oxygen and breathing comfortably 3L NC 93% at Rest.   Admitted for SARS-CoV-2 Infection with Acute Hypoxic Respiratory Failure and UTI  Syncope 2/2 Hypotension potentiated by BP Meds / Dehydration and Acute Infection additively / Doubt Shock from COVID19  c/w Hydroxychloroquine as above / Oxygen support / Ceftriaxone / f/u Ucxs  f/u EKG and watch closely QT Interval as she's on upper limit of normal for female. Check one in AM.   Pt eating well no need for IVF   Hold BP Meds / Monitor BP   Triple Isolation Precautions     Vital Signs Last 24 Hrs  T(C): 37.1 (2020 20:47), Max: 37.7 (2020 06:50)  T(F): 98.8 (2020 20:47), Max: 99.8 (2020 06:50)  HR: 82 (2020 20:47) (80 - 88)  BP: 120/74 (2020 20:47) (89/54 - 120/74)  BP(mean): 72 (2020 07:10) (72 - 79)  RR: 18 (2020 20:47) (16 - 18)  SpO2: 98% (2020 20:47) (98% - 99%)    Dispo: Acute /

## 2020-04-02 ENCOUNTER — TRANSCRIPTION ENCOUNTER (OUTPATIENT)
Age: 60
End: 2020-04-02

## 2020-04-02 LAB
ALBUMIN SERPL ELPH-MCNC: 2.8 G/DL — LOW (ref 3.5–5.2)
ALP SERPL-CCNC: 43 U/L — SIGNIFICANT CHANGE UP (ref 30–115)
ALT FLD-CCNC: 16 U/L — SIGNIFICANT CHANGE UP (ref 0–41)
ANION GAP SERPL CALC-SCNC: 12 MMOL/L — SIGNIFICANT CHANGE UP (ref 7–14)
AST SERPL-CCNC: 23 U/L — SIGNIFICANT CHANGE UP (ref 0–41)
BASOPHILS # BLD AUTO: 0.02 K/UL — SIGNIFICANT CHANGE UP (ref 0–0.2)
BASOPHILS NFR BLD AUTO: 0.2 % — SIGNIFICANT CHANGE UP (ref 0–1)
BILIRUB SERPL-MCNC: 0.4 MG/DL — SIGNIFICANT CHANGE UP (ref 0.2–1.2)
BUN SERPL-MCNC: 17 MG/DL — SIGNIFICANT CHANGE UP (ref 10–20)
CALCIUM SERPL-MCNC: 8.6 MG/DL — SIGNIFICANT CHANGE UP (ref 8.5–10.1)
CHLORIDE SERPL-SCNC: 104 MMOL/L — SIGNIFICANT CHANGE UP (ref 98–110)
CO2 SERPL-SCNC: 27 MMOL/L — SIGNIFICANT CHANGE UP (ref 17–32)
CREAT SERPL-MCNC: 1.2 MG/DL — SIGNIFICANT CHANGE UP (ref 0.7–1.5)
EOSINOPHIL # BLD AUTO: 0 K/UL — SIGNIFICANT CHANGE UP (ref 0–0.7)
EOSINOPHIL NFR BLD AUTO: 0 % — SIGNIFICANT CHANGE UP (ref 0–8)
GLUCOSE SERPL-MCNC: 108 MG/DL — HIGH (ref 70–99)
HCT VFR BLD CALC: 32.9 % — LOW (ref 37–47)
HGB BLD-MCNC: 11.7 G/DL — LOW (ref 12–16)
IMM GRANULOCYTES NFR BLD AUTO: 1.2 % — HIGH (ref 0.1–0.3)
LYMPHOCYTES # BLD AUTO: 1.85 K/UL — SIGNIFICANT CHANGE UP (ref 1.2–3.4)
LYMPHOCYTES # BLD AUTO: 15.3 % — LOW (ref 20.5–51.1)
MAGNESIUM SERPL-MCNC: 2.2 MG/DL — SIGNIFICANT CHANGE UP (ref 1.8–2.4)
MCHC RBC-ENTMCNC: 27.7 PG — SIGNIFICANT CHANGE UP (ref 27–31)
MCHC RBC-ENTMCNC: 35.6 G/DL — SIGNIFICANT CHANGE UP (ref 32–37)
MCV RBC AUTO: 78 FL — LOW (ref 81–99)
MONOCYTES # BLD AUTO: 0.44 K/UL — SIGNIFICANT CHANGE UP (ref 0.1–0.6)
MONOCYTES NFR BLD AUTO: 3.6 % — SIGNIFICANT CHANGE UP (ref 1.7–9.3)
NEUTROPHILS # BLD AUTO: 9.62 K/UL — HIGH (ref 1.4–6.5)
NEUTROPHILS NFR BLD AUTO: 79.7 % — HIGH (ref 42.2–75.2)
NRBC # BLD: 0 /100 WBCS — SIGNIFICANT CHANGE UP (ref 0–0)
PLATELET # BLD AUTO: 166 K/UL — SIGNIFICANT CHANGE UP (ref 130–400)
POTASSIUM SERPL-MCNC: 3.9 MMOL/L — SIGNIFICANT CHANGE UP (ref 3.5–5)
POTASSIUM SERPL-SCNC: 3.9 MMOL/L — SIGNIFICANT CHANGE UP (ref 3.5–5)
PROT SERPL-MCNC: 5.6 G/DL — LOW (ref 6–8)
RBC # BLD: 4.22 M/UL — SIGNIFICANT CHANGE UP (ref 4.2–5.4)
RBC # FLD: 13.9 % — SIGNIFICANT CHANGE UP (ref 11.5–14.5)
SODIUM SERPL-SCNC: 143 MMOL/L — SIGNIFICANT CHANGE UP (ref 135–146)
WBC # BLD: 12.07 K/UL — HIGH (ref 4.8–10.8)
WBC # FLD AUTO: 12.07 K/UL — HIGH (ref 4.8–10.8)

## 2020-04-02 PROCEDURE — 99232 SBSQ HOSP IP/OBS MODERATE 35: CPT

## 2020-04-02 RX ORDER — METOPROLOL TARTRATE 50 MG
1 TABLET ORAL
Qty: 0 | Refills: 0 | DISCHARGE

## 2020-04-02 RX ORDER — METOPROLOL TARTRATE 50 MG
25 TABLET ORAL
Refills: 0 | Status: DISCONTINUED | OUTPATIENT
Start: 2020-04-02 | End: 2020-04-03

## 2020-04-02 RX ORDER — ONDANSETRON 8 MG/1
4 TABLET, FILM COATED ORAL ONCE
Refills: 0 | Status: COMPLETED | OUTPATIENT
Start: 2020-04-02 | End: 2020-04-02

## 2020-04-02 RX ORDER — NIFEDIPINE 30 MG
1 TABLET, EXTENDED RELEASE 24 HR ORAL
Qty: 0 | Refills: 0 | DISCHARGE

## 2020-04-02 RX ORDER — METOPROLOL TARTRATE 50 MG
25 TABLET ORAL
Refills: 0 | Status: DISCONTINUED | OUTPATIENT
Start: 2020-04-02 | End: 2020-04-02

## 2020-04-02 RX ADMIN — ONDANSETRON 4 MILLIGRAM(S): 8 TABLET, FILM COATED ORAL at 17:17

## 2020-04-02 RX ADMIN — ENOXAPARIN SODIUM 40 MILLIGRAM(S): 100 INJECTION SUBCUTANEOUS at 12:00

## 2020-04-02 RX ADMIN — CEFTRIAXONE 100 MILLIGRAM(S): 500 INJECTION, POWDER, FOR SOLUTION INTRAMUSCULAR; INTRAVENOUS at 17:54

## 2020-04-02 RX ADMIN — ESCITALOPRAM OXALATE 20 MILLIGRAM(S): 10 TABLET, FILM COATED ORAL at 12:59

## 2020-04-02 RX ADMIN — ATORVASTATIN CALCIUM 10 MILLIGRAM(S): 80 TABLET, FILM COATED ORAL at 22:11

## 2020-04-02 RX ADMIN — Medication 25 MILLIGRAM(S): at 17:54

## 2020-04-02 RX ADMIN — Medication 200 MILLIGRAM(S): at 07:27

## 2020-04-02 RX ADMIN — Medication 200 MILLIGRAM(S): at 19:47

## 2020-04-02 RX ADMIN — FAMOTIDINE 20 MILLIGRAM(S): 10 INJECTION INTRAVENOUS at 12:59

## 2020-04-02 NOTE — DISCHARGE NOTE PROVIDER - HOSPITAL COURSE
60 yo F with PMH of HTN and DLD presented after syncopal episode. Reports she was at home when she felt weak and lightheaded, proceeded to pass out. Denies head strike, event was witnessed by kids. No seizure like activity reported. Also endorses malaise, chills, and dry cough over past 2 days. Denies fevers, N/V/C/D, changes in taste or smell, dysuria, or other complaints. No known COVID-19 contacts. No recent travel. States she has history of syncope in past last event 2 years ago. While here pt was noted to be hypotensive so anti-HTN meds held.         Patient was successfully weaned off 2L NC to RA and SaO2 96%. Pt received plquanil while here but had no real respiratory complaints. For d/c home to family. 58 yo F with PMH of HTN and DLD presented after syncopal episode. Reports she was at home when she felt weak and lightheaded, proceeded to pass out. Denies head strike, event was witnessed by kids. No seizure like activity reported. Also endorses malaise, chills, and dry cough over past 2 days. Denies fevers, N/V/C/D, changes in taste or smell, dysuria, or other complaints. No known COVID-19 contacts. No recent travel. States she has history of syncope in past last event 2 years ago.         While here pt was noted to be hypotensive so anti-HTN meds held. BP improved off meds. Tested positive for COVID. Patient was successfully weaned off 2L NC to RA and SaO2 96%. She is saturating 92% on RA on ambulation. She will be discharged home to complete a 5 day course of Plaquenil.

## 2020-04-02 NOTE — DISCHARGE NOTE PROVIDER - NSDCMRMEDTOKEN_GEN_ALL_CORE_FT
atorvastatin 10 mg oral tablet: 1 tab(s) orally once a day  cloNIDine 0.1 mg oral tablet: 1 tab(s) orally 2 times a day  escitalopram 20 mg oral tablet: 1 tab(s) orally once a day atorvastatin 10 mg oral tablet: 1 tab(s) orally once a day  escitalopram 20 mg oral tablet: 1 tab(s) orally once a day  hydroxychloroquine 200 mg oral tablet: 1 tab(s) orally 2 times a day   metoprolol tartrate 25 mg oral tablet: 1 tab(s) orally 2 times a day

## 2020-04-02 NOTE — DISCHARGE NOTE PROVIDER - NSDCCPCAREPLAN_GEN_ALL_CORE_FT
PRINCIPAL DISCHARGE DIAGNOSIS  Diagnosis: Viral pneumonia  Assessment and Plan of Treatment: You were tested POSITIVE FOR COVID while in hospital.  - Upon returning home, please try to ISOLATE away from other family members. Stay in a separate room. Use a separate bathroom if possible. Keep 6 feet away and minimize prolonged interaction.  - Keep surfaces clean and wiped down at home.  - Please hydrate well, if you have no appetite - please try to keep your intake up with applesauce/soups or other mild things to avoid dehydration and low blood sugars.   - You can use Tylenol for fevers (avoid NSAIDs such as motrin).   - No further antibiotics or anti-viral medications are prescribed at this time. Please note changes in blood pressure meds.  - If you develop any of the following: WORSENING SHORTNESS OF BREATH, high fevers, non-stop diarrhea or vomiting or other concerning symptoms - seek immediate medical attention. Do not wait. Call 911 if you need emergent help.  - Follow up with your primary medical doctor as needed.      SECONDARY DISCHARGE DIAGNOSES  Diagnosis: Hypertension  Assessment and Plan of Treatment: You have a history of high blood pressure however while in hospital your blood pressure was low so your medications were held.  - We recommend STOPPING nifedipine, metoprolol for now and continuing only with clonidine.   - If possible please obtain a home blood pressure machine (available at most pharmacy at a reasonable cost), and monitor. If your blood pressure is rising, systolic (top #) >140 or diastolic (lower #) >100 please call your primary doctor right away to help guide restarting your blood pressure medications.  - If you feel light headed or find that your blood pressure is LOW, we recommend holding clonidine as well.   Please follow up with your primary to guide when to restart your home medications.    Diagnosis: Syncope  Assessment and Plan of Treatment: You had a event during which you collapssed, possibly due to low pressure which may have been related to the COVID19 virus.  - Please monitor your blood pressure at home if possible. When you stand up, do so slowly to allow your body to eliquibrate.  - If you develop worsening lightheadedness, chest pain, dizziness, palpitations or other such symptoms, seek IMMEDIATE medical attention. PRINCIPAL DISCHARGE DIAGNOSIS  Diagnosis: Viral pneumonia  Assessment and Plan of Treatment: You were tested POSITIVE FOR COVID while in hospital.  - Upon returning home, please try to ISOLATE away from other family members. Stay in a separate room. Use a separate bathroom if possible. Keep 6 feet away and minimize prolonged interaction.  - Keep surfaces clean and wiped down at home.  - Please hydrate well, if you have no appetite - please try to keep your intake up with applesauce/soups or other mild things to avoid dehydration and low blood sugars.   - You can use Tylenol for fevers (avoid NSAIDs such as motrin).   - No further antibiotics or anti-viral medications are prescribed at this time. Please note changes in blood pressure meds.  - If you develop any of the following: WORSENING SHORTNESS OF BREATH, high fevers, non-stop diarrhea or vomiting or other concerning symptoms - seek immediate medical attention. Do not wait. Call 911 if you need emergent help.  - Follow up with your primary medical doctor as needed.      SECONDARY DISCHARGE DIAGNOSES  Diagnosis: Hypertension  Assessment and Plan of Treatment: You have a history of high blood pressure however while in hospital your blood pressure was low so your medications were held.  - We recommend STOPPING nifedipine, clonidine for now and continuing only with metoprolol.   - If possible please obtain a home blood pressure machine (available at most pharmacy at a reasonable cost), and monitor. If your blood pressure is rising, systolic (top #) >140 or diastolic (lower #) >100 please call your primary doctor right away to help guide restarting your blood pressure medications.  - If you feel light headed or find that your blood pressure is LOW, we recommend holding clonidine as well.   Please follow up with your primary doctor to guide when to restart your home medications.    Diagnosis: Syncope  Assessment and Plan of Treatment: You had a event during which you collapssed, possibly due to low pressure which may have been related to the COVID19 virus.  - Please monitor your blood pressure at home if possible. When you stand up, do so slowly to allow your body to eliquibrate.  - If you develop worsening lightheadedness, chest pain, dizziness, palpitations or other such symptoms, seek IMMEDIATE medical attention.

## 2020-04-02 NOTE — PROGRESS NOTE ADULT - SUBJECTIVE AND OBJECTIVE BOX
BRAYDEN MARTINO  59y  Female      Patient is a 59y old  Female who presents with a chief complaint of syncope (2020 12:28)      INTERVAL HPI/OVERNIGHT EVENTS:  She feels better, no cough or SOB, no other complaints.   Vital Signs Last 24 Hrs  T(C): 37.1 (2020 20:47), Max: 37.3 (2020 16:33)  T(F): 98.8 (2020 20:47), Max: 99.2 (2020 16:33)  HR: 82 (2020 20:47) (80 - 82)  BP: 120/74 (2020 20:47) (107/56 - 120/74)  BP(mean): --  RR: 18 (2020 20:47) (18 - 18)  SpO2: 98% (2020 20:47) (98% - 98%)            Consultant(s) Notes Reviewed:  [x ] YES  [ ] NO          MEDICATIONS  (STANDING):  atorvastatin 10 milliGRAM(s) Oral at bedtime  cefTRIAXone   IVPB 1000 milliGRAM(s) IV Intermittent every 24 hours  chlorhexidine 4% Liquid 1 Application(s) Topical <User Schedule>  cloNIDine 0.1 milliGRAM(s) Oral every 12 hours  enoxaparin Injectable 40 milliGRAM(s) SubCutaneous daily  escitalopram 20 milliGRAM(s) Oral daily  famotidine    Tablet 20 milliGRAM(s) Oral daily  hydroxychloroquine   Oral   hydroxychloroquine 200 milliGRAM(s) Oral every 12 hours    MEDICATIONS  (PRN):  acetaminophen   Tablet .. 650 milliGRAM(s) Oral every 6 hours PRN Temp greater or equal to 38C (100.4F)  acetaminophen   Tablet .. 650 milliGRAM(s) Oral every 6 hours PRN Mild Pain (1 - 3)      LABS                          11.7   12.07 )-----------( 166      ( 2020 06:47 )             32.9     04-02    143  |  104  |  17  ----------------------------<  108<H>  3.9   |  27  |  1.2    Ca    8.6      2020 06:47  Mg     2.2     04-02    TPro  5.6<L>  /  Alb  2.8<L>  /  TBili  0.4  /  DBili  x   /  AST  23  /  ALT  16  /  AlkPhos  43  04-02      Urinalysis Basic - ( 31 Mar 2020 18:10 )    Color: Yellow / Appearance: Slightly Turbid / S.016 / pH: x  Gluc: x / Ketone: Small  / Bili: Negative / Urobili: 3 mg/dL   Blood: x / Protein: 30 mg/dL / Nitrite: Negative   Leuk Esterase: Negative / RBC: 4 /HPF / WBC 11 /HPF   Sq Epi: x / Non Sq Epi: 14 /HPF / Bacteria: Moderate        Lactate Trend    CARDIAC MARKERS ( 31 Mar 2020 16:00 )  x     / <0.01 ng/mL / 115 U/L / x     / x          CAPILLARY BLOOD GLUCOSE          Culture - Urine (collected 20 @ 18:10)  Source: .Urine Clean Catch (Midstream)  Final Report (20 @ 22:33):    >=3 organisms. Probable collection contamination.        RADIOLOGY & ADDITIONAL TESTS:    Imaging Personally Reviewed:  [ ] YES  [ ] NO    HEALTH ISSUES - PROBLEM Dx:        PHYSICAL EXAM:  GENERAL: NAD, well-developed  HEAD:  Atraumatic, Normocephalic  EYES: EOMI, PERRLA, conjunctiva and sclera clear  NECK: Supple, No JVD  CHEST/LUNG: Clear to auscultation bilaterally; No wheeze  HEART: Regular rate and rhythm; S1 S2  ABDOMEN: Soft, Nontender, Nondistended; Bowel sounds present  EXTREMITIES:  2+ Peripheral Pulses, No clubbing, cyanosis, or edema  PSYCH: AAOx3  NEUROLOGY: non-focal  SKIN: No rashes or lesions

## 2020-04-02 NOTE — PROGRESS NOTE ADULT - SUBJECTIVE AND OBJECTIVE BOX
BRAYDEN MARTINO 59y Female  MRN#: [id number]   CODE STATUS:___full_____      SUBJECTIVE  Patient is a 59y old Female who presents with a chief complaint of syncope (31 Mar 2020 18:33)  Currently admitted to medicine with the primary diagnosis of Syncope  Hospital course has been complicated by hypotension  Today is hospital day 2d, and this morning she is feeling much better and reports no acute complaints. Off O2, 96%.   Overnight pt's BP is improving, though BP meds still held.    OBJECTIVE  PAST MEDICAL & SURGICAL HISTORY  Mood disorder  HLD (hyperlipidemia)  Hypertension, unspecified type    ALLERGIES:  No Known Allergies    MEDICATIONS:  STANDING MEDICATIONS  atorvastatin 10 milliGRAM(s) Oral at bedtime  cefTRIAXone   IVPB 1000 milliGRAM(s) IV Intermittent every 24 hours  chlorhexidine 4% Liquid 1 Application(s) Topical <User Schedule>  cloNIDine 0.1 milliGRAM(s) Oral every 12 hours  enoxaparin Injectable 40 milliGRAM(s) SubCutaneous daily  escitalopram 20 milliGRAM(s) Oral daily  famotidine    Tablet 20 milliGRAM(s) Oral daily  hydroxychloroquine 200 milliGRAM(s) Oral every 12 hours  hydroxychloroquine   Oral     PRN MEDICATIONS  acetaminophen   Tablet .. 650 milliGRAM(s) Oral every 6 hours PRN  acetaminophen   Tablet .. 650 milliGRAM(s) Oral every 6 hours PRN      VITAL SIGNS: Last 24 Hours  T(C): 36.6 (2020 13:01), Max: 37.3 (2020 16:33)  T(F): 97.8 (2020 13:01), Max: 99.2 (2020 16:33)  HR: 79 (2020 13:01) (79 - 82)  BP: 115/70 (2020 13:01) (107/56 - 120/74)  BP(mean): --  RR: 19 (2020 13:01) (18 - 19)  SpO2: 92% (2020 13:01) (92% - 98%)    LABS:                        11.7   12.07 )-----------( 166      ( 2020 06:47 )             32.9     04-02    143  |  104  |  17  ----------------------------<  108<H>  3.9   |  27  |  1.2    Ca    8.6      2020 06:47  Mg     2.2     04-02    TPro  5.6<L>  /  Alb  2.8<L>  /  TBili  0.4  /  DBili  x   /  AST  23  /  ALT  16  /  AlkPhos  43  04-02      Urinalysis Basic - ( 31 Mar 2020 18:10 )    Color: Yellow / Appearance: Slightly Turbid / S.016 / pH: x  Gluc: x / Ketone: Small  / Bili: Negative / Urobili: 3 mg/dL   Blood: x / Protein: 30 mg/dL / Nitrite: Negative   Leuk Esterase: Negative / RBC: 4 /HPF / WBC 11 /HPF   Sq Epi: x / Non Sq Epi: 14 /HPF / Bacteria: Moderate            Culture - Urine (collected 31 Mar 2020 18:10)  Source: .Urine Clean Catch (Midstream)  Final Report (2020 22:33):    >=3 organisms. Probable collection contamination.      CARDIAC MARKERS ( 31 Mar 2020 16:00 )  x     / <0.01 ng/mL / 115 U/L / x     / x          RADIOLOGY:      PHYSICAL EXAM:    GENERAL: NAD, well-developed AAF comfortable, AAOx3  HEENT:  Atraumatic, Normocephalic. EOMI, PERRLA, conjunctiva and sclera clear, No JVD  PULMONARY: Clear to auscultation bilaterally; No wheeze/crackles appreciated though exam but limited by habitus/positioning  CARDIOVASCULAR: Regular rate and rhythm; No murmurs  GASTROINTESTINAL: Soft, Nontender, Nondistended; Bowel sounds normoactive  MUSCULOSKELETAL:  2+ Peripheral Pulses, No periph edema  NEUROLOGY: non-focal  SKIN: No rashes    ADMISSION SUMMARY  Patient is a 59y old Female who presents with a chief complaint of syncope (31 Mar 2020 18:33)  Currently admitted to medicine with the primary diagnosis of Syncope  58 yo F with PMH of HTN and DLD presented after syncopal episode. Reports she was at home when she felt weak and lightheaded, proceeded to pass out. Denies head strike, event was witnessed by kids. No seizure like activity reported. Also endorses malaise, chills, and dry cough over past 2 days. Denies fevers, N/V/C/D, changes in taste or smell, dysuria, or other complaints. No known COVID-19 contacts. No recent travel. States she has history of syncope in past last event 2 years ago.      ASSESSMENT & PLAN  58 yo F with PMH of HTN and DLD presented after syncopal episode. Also endorses loss of appetite, chills, and dry cough for past 2-3 days.    #) Cough + malaise likely due to COVID-19  #) other possible underlying infection  - CXR: b/l interstitial opacities  - no hypoxia, 99% on RA but on 2L for pt comfort 99%.   - however WBC 17 w/ left shift, no leukopenia  - UA neg (no leuko/nitrile), UCX contaminated  - f/u BCx  - Tylenol PRN  - started on plaquanil & ceftriaxone (possible bacterial CAP or UTI?)  - QTc 470, EP consult placed  - CRP 17.5, procal 0.77,     #) Syncope, witnessed  #) Hypotension - improving  - possibly vagal mediated event due to dehydration/decreased PO intake and underlying infection, orthostatic hypotension   - BP now 80/40s overnight, s/p bolus  - holding CLONIDINE, nifedipine (watch for rebound HTN)  - will restart metoprolol tonight    #) HTN  - hypotensive; HOLD Procardia + Clonidine   - will restart BB only for now (off clonidine for ~36hr)    #) DLD - c/w statin  #) Mood disorder - c/w SSRI    #MISC  - DVT ppx: Lovenox subQ  - Diet: DASH  - Activity: AAT  - Code status: FULL    (x) Discussion with patient and/or family regarding goals of care  (x) Discussion with attending - Nilam

## 2020-04-02 NOTE — DISCHARGE NOTE PROVIDER - CARE PROVIDER_API CALL
Primary Care provider,   follow up with home PCP  Phone: (   )    -  Fax: (   )    -  Follow Up Time:

## 2020-04-02 NOTE — DISCHARGE NOTE PROVIDER - NSFOLLOWUPCLINICS_GEN_ALL_ED_FT
[facility]  Medicine  [address], [place]   Phone: [phone]  Fax:   Follow Up Time:

## 2020-04-02 NOTE — DISCHARGE NOTE PROVIDER - PROVIDER TOKENS
FREE:[LAST:[Primary Care provider],PHONE:[(   )    -],FAX:[(   )    -],ADDRESS:[follow up with home PCP]]

## 2020-04-03 ENCOUNTER — TRANSCRIPTION ENCOUNTER (OUTPATIENT)
Age: 60
End: 2020-04-03

## 2020-04-03 VITALS
HEART RATE: 68 BPM | TEMPERATURE: 99 F | SYSTOLIC BLOOD PRESSURE: 140 MMHG | RESPIRATION RATE: 18 BRPM | DIASTOLIC BLOOD PRESSURE: 75 MMHG

## 2020-04-03 PROCEDURE — 99239 HOSP IP/OBS DSCHRG MGMT >30: CPT

## 2020-04-03 PROCEDURE — 71045 X-RAY EXAM CHEST 1 VIEW: CPT | Mod: 26

## 2020-04-03 RX ORDER — HYDROXYCHLOROQUINE SULFATE 200 MG
1 TABLET ORAL
Qty: 5 | Refills: 0
Start: 2020-04-03 | End: 2020-04-05

## 2020-04-03 RX ORDER — METOPROLOL TARTRATE 50 MG
1 TABLET ORAL
Qty: 0 | Refills: 0 | DISCHARGE
Start: 2020-04-03

## 2020-04-03 RX ADMIN — Medication 25 MILLIGRAM(S): at 05:00

## 2020-04-03 RX ADMIN — ESCITALOPRAM OXALATE 20 MILLIGRAM(S): 10 TABLET, FILM COATED ORAL at 12:04

## 2020-04-03 RX ADMIN — Medication 200 MILLIGRAM(S): at 10:02

## 2020-04-03 RX ADMIN — ENOXAPARIN SODIUM 40 MILLIGRAM(S): 100 INJECTION SUBCUTANEOUS at 12:04

## 2020-04-03 RX ADMIN — FAMOTIDINE 20 MILLIGRAM(S): 10 INJECTION INTRAVENOUS at 12:04

## 2020-04-03 NOTE — DISCHARGE NOTE NURSING/CASE MANAGEMENT/SOCIAL WORK - PATIENT PORTAL LINK FT
You can access the FollowMyHealth Patient Portal offered by [facility] by registering at the following website: [email or web address]. By joining [employer]’s FollowMyHealth portal, you will also be able to view your health information using other applications (apps) compatible with our system.

## 2020-04-03 NOTE — CHART NOTE - NSCHARTNOTEFT_GEN_A_CORE
HCP Amee daughter - number on chart [phone]
Quality 410: Psoriasis Clinical Response To Oral Systemic Or Biologic Medications: Documentation that the patient declined therapy change
RESIDENT DISCHARGE NOTE     Patient Name: BRAYDEN MARTINO  MRN-[id number]    VITAL SIGNS:  T(F): 97.3 (04-03-20 @ 05:49), Max: 98.6 (04-02-20 @ 21:08)  HR: 69 (04-03-20 @ 05:49)  BP: 139/79 (04-03-20 @ 05:49)  SpO2: 92% (04-03-20 @ 09:40) on RA on ambulation, 96% on RA at rest  Weight (kg): 84.1 (04-03-20 @ 05:49)    PHYSICAL EXAMINATION:  CONSTITUTIONAL: No acute distress, well-developed, well-groomed, AAOx3  HEAD: Atraumatic, normocephalic  EYES: EOM intact, PERRLA, conjunctiva and sclera clear  ENT: Supple, no masses, no thyromegaly, no bruits, no JVD; moist mucous membranes  PULMONARY: Clear to auscultation bilaterally; no wheezes, rales, or rhonchi  CARDIOVASCULAR: Regular rate and rhythm; no murmurs, rubs, or gallops  GASTROINTESTINAL: Soft, non-tender, non-distended; bowel sounds present  MUSCULOSKELETAL: 2+ peripheral pulses; no clubbing, no cyanosis, no edema  NEUROLOGY: non-focal  SKIN: No rashes or lesions; warm and dry    TEST RESULTS:                        11.7   12.07 )-----------( 166      ( 02 Apr 2020 06:47 )             32.9     04-02    143  |  104  |  17  ----------------------------<  108<H>  3.9   |  27  |  1.2    Ca    8.6      02 Apr 2020 06:47  Mg     2.2     04-02    TPro  5.6<L>  /  Alb  2.8<L>  /  TBili  0.4  /  DBili  x   /  AST  23  /  ALT  16  /  AlkPhos  43  04-02      FINAL DISCHARGE INTERVIEW:  Resident Present: Yuriy Chen MD  RN Present:     DISCHARGE MEDICATION RECONCILIATION:  Reviewed with Attending (Name: Dr. Demarco)    DISPOSITION:  [ X ] Home  [  ] Home with Visiting Nursing Services  [  ] SNF/ NH  [  ] Acute Rehab (4A)  [  ] Other (Specify:_________)
Detail Level: Simple
Detail Level: Generalized

## 2020-04-03 NOTE — PROGRESS NOTE ADULT - SUBJECTIVE AND OBJECTIVE BOX
BRAYDEN MARTINO  59y  Female      Patient is a 59y old  Female who presents with a chief complaint of syncope (02 Apr 2020 13:38)      INTERVAL HPI/OVERNIGHT EVENTS:  She feels ok, no fever or cough, no SOB  Vital Signs Last 24 Hrs  T(C): 36.3 (03 Apr 2020 05:49), Max: 37 (02 Apr 2020 21:08)  T(F): 97.3 (03 Apr 2020 05:49), Max: 98.6 (02 Apr 2020 21:08)  HR: 69 (03 Apr 2020 05:49) (65 - 79)  BP: 139/79 (03 Apr 2020 05:49) (115/70 - 139/79)  BP(mean): --  RR: 18 (03 Apr 2020 08:01) (18 - 20)  SpO2: 92% (03 Apr 2020 09:40) (92% - 96%)      04-02-20 @ 07:01  -  04-03-20 @ 07:00  --------------------------------------------------------  IN: 160 mL / OUT: 550 mL / NET: -390 mL            Consultant(s) Notes Reviewed:  [x ] YES  [ ] NO          MEDICATIONS  (STANDING):  atorvastatin 10 milliGRAM(s) Oral at bedtime  cefTRIAXone   IVPB 1000 milliGRAM(s) IV Intermittent every 24 hours  chlorhexidine 4% Liquid 1 Application(s) Topical <User Schedule>  enoxaparin Injectable 40 milliGRAM(s) SubCutaneous daily  escitalopram 20 milliGRAM(s) Oral daily  famotidine    Tablet 20 milliGRAM(s) Oral daily  hydroxychloroquine 200 milliGRAM(s) Oral every 12 hours  hydroxychloroquine   Oral   metoprolol tartrate 25 milliGRAM(s) Oral two times a day    MEDICATIONS  (PRN):  acetaminophen   Tablet .. 650 milliGRAM(s) Oral every 6 hours PRN Temp greater or equal to 38C (100.4F)  acetaminophen   Tablet .. 650 milliGRAM(s) Oral every 6 hours PRN Mild Pain (1 - 3)      LABS                          11.7   12.07 )-----------( 166      ( 02 Apr 2020 06:47 )             32.9     04-02    143  |  104  |  17  ----------------------------<  108<H>  3.9   |  27  |  1.2    Ca    8.6      02 Apr 2020 06:47  Mg     2.2     04-02    TPro  5.6<L>  /  Alb  2.8<L>  /  TBili  0.4  /  DBili  x   /  AST  23  /  ALT  16  /  AlkPhos  43  04-02          Lactate Trend        CAPILLARY BLOOD GLUCOSE          Culture - Blood (collected 04-01-20 @ 08:23)  Source: .Blood Blood  Preliminary Report (04-02-20 @ 22:01):    No growth to date.    Culture - Urine (collected 03-31-20 @ 18:10)  Source: .Urine Clean Catch (Midstream)  Final Report (04-01-20 @ 22:33):    >=3 organisms. Probable collection contamination.        RADIOLOGY & ADDITIONAL TESTS:    Imaging Personally Reviewed:  [ ] YES  [ ] NO    HEALTH ISSUES - PROBLEM Dx:        PHYSICAL EXAM:  GENERAL: NAD, well-developed  HEAD:  Atraumatic, Normocephalic  EYES: EOMI, PERRLA, conjunctiva and sclera clear  NECK: Supple, No JVD  CHEST/LUNG: Clear to auscultation bilaterally; No wheeze  HEART: Regular rate and rhythm; S1 S2  ABDOMEN: Soft, Nontender, Nondistended; Bowel sounds present  EXTREMITIES:  2+ Peripheral Pulses, No clubbing, cyanosis, or edema  PSYCH: AAOx3  NEUROLOGY: non-focal  SKIN: No rashes or lesions

## 2020-04-03 NOTE — PROGRESS NOTE ADULT - ASSESSMENT
A 60 yo Female with PMH of HTN and Hyperlipidemia was brought to ED by [facility] for syncopal episode. Patient was at home, she was feeling dizzy and lightheaded since morning, this afternoon she tried to stand up and passed out, her family witnessed her and called 911. Patient was feeling ill for 4 days, she reports fatigue, muscle aches, cough and loss of taste, she was not eating well. She denies fever, palpitation, SOB or chills. In the ED Temp 99, /63, HR 92. EKG showed NSR, Troponin was negative. CXR showed bilateral interstitial infiltrates. COVID-19 was positive.     A/P:   Syncopal episode:   Possible from underlying infection, hypotension, orthostatic changes.   EKG no arrhythmia.     COVID-19 pneumonia:   No hypoxia.   CXR showed bilateral interstitial opacities  Discharge on Plaquenil to complete 5 days. QTc 470.   CRP 17.5, procal 0.77,     HTN  BP is stable today, Metoprolol resumed,  Keep holding  Nifedipine and Clonidine.  Monitor BP at home resume other medications as needed.     Leukocytosis: improved.   UTI is less likely, patient is asymptomatic, urine culture is contaminated.     #Progress Note Handoff:  Pending (specify): none  Family discussion:  Disposition: Home today

## 2020-04-05 LAB
CULTURE RESULTS: SIGNIFICANT CHANGE UP
SPECIMEN SOURCE: SIGNIFICANT CHANGE UP

## 2020-04-08 PROBLEM — Z00.00 ENCOUNTER FOR PREVENTIVE HEALTH EXAMINATION: Status: ACTIVE | Noted: 2020-04-08

## 2021-02-17 PROBLEM — I10 ESSENTIAL (PRIMARY) HYPERTENSION: Chronic | Status: ACTIVE | Noted: 2020-03-31

## 2021-02-17 PROBLEM — E78.5 HYPERLIPIDEMIA, UNSPECIFIED: Chronic | Status: ACTIVE | Noted: 2020-03-31

## 2021-02-17 PROBLEM — F39 UNSPECIFIED MOOD [AFFECTIVE] DISORDER: Chronic | Status: ACTIVE | Noted: 2020-03-31

## 2021-03-01 ENCOUNTER — APPOINTMENT (OUTPATIENT)
Dept: OPHTHALMOLOGY | Facility: CLINIC | Age: 61
End: 2021-03-01

## 2021-04-01 ENCOUNTER — APPOINTMENT (OUTPATIENT)
Dept: INTERNAL MEDICINE | Facility: CLINIC | Age: 61
End: 2021-04-01

## 2021-06-01 PROCEDURE — G9005: CPT

## 2021-06-01 PROCEDURE — G9001: CPT

## 2021-06-07 ENCOUNTER — APPOINTMENT (OUTPATIENT)
Dept: INTERNAL MEDICINE | Facility: CLINIC | Age: 61
End: 2021-06-07

## 2021-07-01 ENCOUNTER — OUTPATIENT (OUTPATIENT)
Dept: OUTPATIENT SERVICES | Facility: HOSPITAL | Age: 61
LOS: 1 days | End: 2021-07-01
Payer: MEDICAID

## 2021-08-02 DIAGNOSIS — Z71.89 OTHER SPECIFIED COUNSELING: ICD-10-CM

## 2021-08-19 ENCOUNTER — APPOINTMENT (OUTPATIENT)
Dept: OPHTHALMOLOGY | Facility: CLINIC | Age: 61
End: 2021-08-19

## 2021-08-19 ENCOUNTER — OUTPATIENT (OUTPATIENT)
Dept: OUTPATIENT SERVICES | Facility: HOSPITAL | Age: 61
LOS: 1 days | Discharge: HOME | End: 2021-08-19
Payer: MEDICAID

## 2021-08-19 PROCEDURE — 92133 CPTRZD OPH DX IMG PST SGM ON: CPT | Mod: 26

## 2021-08-19 PROCEDURE — 92020 GONIOSCOPY: CPT

## 2021-08-19 PROCEDURE — 92002 INTRM OPH EXAM NEW PATIENT: CPT

## 2021-08-23 DIAGNOSIS — H52.4 PRESBYOPIA: ICD-10-CM

## 2021-08-23 DIAGNOSIS — H02.88A MEIBOMIAN GLAND DYSFUNCTION RIGHT EYE, UPPER AND LOWER EYELIDS: ICD-10-CM

## 2021-08-23 DIAGNOSIS — H40.1133 PRIMARY OPEN-ANGLE GLAUCOMA, BILATERAL, SEVERE STAGE: ICD-10-CM

## 2021-09-01 ENCOUNTER — OUTPATIENT (OUTPATIENT)
Dept: OUTPATIENT SERVICES | Facility: HOSPITAL | Age: 61
LOS: 1 days | Discharge: HOME | End: 2021-09-01
Payer: MEDICAID

## 2021-09-01 ENCOUNTER — APPOINTMENT (OUTPATIENT)
Dept: OPHTHALMOLOGY | Facility: CLINIC | Age: 61
End: 2021-09-01

## 2021-09-01 PROCEDURE — 92083 EXTENDED VISUAL FIELD XM: CPT | Mod: 26

## 2021-09-01 PROCEDURE — 92012 INTRM OPH EXAM EST PATIENT: CPT

## 2021-09-01 PROCEDURE — 76514 ECHO EXAM OF EYE THICKNESS: CPT | Mod: 26

## 2021-09-15 ENCOUNTER — APPOINTMENT (OUTPATIENT)
Dept: INTERNAL MEDICINE | Facility: CLINIC | Age: 61
End: 2021-09-15

## 2021-09-15 ENCOUNTER — APPOINTMENT (OUTPATIENT)
Dept: OPHTHALMOLOGY | Facility: CLINIC | Age: 61
End: 2021-09-15

## 2021-09-23 ENCOUNTER — APPOINTMENT (OUTPATIENT)
Dept: INTERNAL MEDICINE | Facility: CLINIC | Age: 61
End: 2021-09-23

## 2021-11-03 ENCOUNTER — APPOINTMENT (OUTPATIENT)
Dept: INTERNAL MEDICINE | Facility: CLINIC | Age: 61
End: 2021-11-03

## 2021-12-01 PROCEDURE — G9005: CPT

## 2022-05-03 ENCOUNTER — EMERGENCY (EMERGENCY)
Facility: HOSPITAL | Age: 62
LOS: 0 days | Discharge: HOME | End: 2022-05-03
Attending: EMERGENCY MEDICINE | Admitting: EMERGENCY MEDICINE
Payer: MEDICAID

## 2022-05-03 VITALS
SYSTOLIC BLOOD PRESSURE: 171 MMHG | TEMPERATURE: 98 F | OXYGEN SATURATION: 97 % | RESPIRATION RATE: 18 BRPM | DIASTOLIC BLOOD PRESSURE: 100 MMHG | HEART RATE: 96 BPM

## 2022-05-03 VITALS
SYSTOLIC BLOOD PRESSURE: 190 MMHG | DIASTOLIC BLOOD PRESSURE: 98 MMHG | HEART RATE: 85 BPM | RESPIRATION RATE: 17 BRPM | TEMPERATURE: 98 F | OXYGEN SATURATION: 97 %
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
## 2022-05-03 DIAGNOSIS — R10.9 UNSPECIFIED ABDOMINAL PAIN: ICD-10-CM

## 2022-05-03 DIAGNOSIS — Z86.59 PERSONAL HISTORY OF OTHER MENTAL AND BEHAVIORAL DISORDERS: ICD-10-CM

## 2022-05-03 DIAGNOSIS — R07.81 PLEURODYNIA: ICD-10-CM
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
## 2022-05-03 DIAGNOSIS — E78.5 HYPERLIPIDEMIA, UNSPECIFIED: ICD-10-CM

## 2022-05-03 DIAGNOSIS — S22.32XA FRACTURE OF ONE RIB, LEFT SIDE, INITIAL ENCOUNTER FOR CLOSED FRACTURE: ICD-10-CM

## 2022-05-03 DIAGNOSIS — I10 ESSENTIAL (PRIMARY) HYPERTENSION: ICD-10-CM

## 2022-05-03 LAB
ALBUMIN SERPL ELPH-MCNC: 4.2 G/DL — SIGNIFICANT CHANGE UP (ref 3.5–5.2)
ALP SERPL-CCNC: 74 U/L — SIGNIFICANT CHANGE UP (ref 30–115)
ALT FLD-CCNC: 14 U/L — SIGNIFICANT CHANGE UP (ref 0–41)
ANION GAP SERPL CALC-SCNC: 12 MMOL/L — SIGNIFICANT CHANGE UP (ref 7–14)
APTT BLD: 36.6 SEC — SIGNIFICANT CHANGE UP (ref 27–39.2)
AST SERPL-CCNC: 23 U/L — SIGNIFICANT CHANGE UP (ref 0–41)
BASOPHILS # BLD AUTO: 0.02 K/UL — SIGNIFICANT CHANGE UP (ref 0–0.2)
BASOPHILS NFR BLD AUTO: 0.2 % — SIGNIFICANT CHANGE UP (ref 0–1)
BILIRUB SERPL-MCNC: 0.4 MG/DL — SIGNIFICANT CHANGE UP (ref 0.2–1.2)
BUN SERPL-MCNC: 15 MG/DL — SIGNIFICANT CHANGE UP (ref 10–20)
CALCIUM SERPL-MCNC: 9.7 MG/DL — SIGNIFICANT CHANGE UP (ref 8.5–10.1)
CHLORIDE SERPL-SCNC: 103 MMOL/L — SIGNIFICANT CHANGE UP (ref 98–110)
CO2 SERPL-SCNC: 26 MMOL/L — SIGNIFICANT CHANGE UP (ref 17–32)
CREAT SERPL-MCNC: 0.9 MG/DL — SIGNIFICANT CHANGE UP (ref 0.7–1.5)
EGFR: 73 ML/MIN/1.73M2 — SIGNIFICANT CHANGE UP
EOSINOPHIL # BLD AUTO: 0.08 K/UL — SIGNIFICANT CHANGE UP (ref 0–0.7)
EOSINOPHIL NFR BLD AUTO: 0.9 % — SIGNIFICANT CHANGE UP (ref 0–8)
GLUCOSE SERPL-MCNC: 116 MG/DL — HIGH (ref 70–99)
HCT VFR BLD CALC: 42.2 % — SIGNIFICANT CHANGE UP (ref 37–47)
HGB BLD-MCNC: 15 G/DL — SIGNIFICANT CHANGE UP (ref 12–16)
IMM GRANULOCYTES NFR BLD AUTO: 0.3 % — SIGNIFICANT CHANGE UP (ref 0.1–0.3)
INR BLD: 1.02 RATIO — SIGNIFICANT CHANGE UP (ref 0.65–1.3)
LYMPHOCYTES # BLD AUTO: 2.57 K/UL — SIGNIFICANT CHANGE UP (ref 1.2–3.4)
LYMPHOCYTES # BLD AUTO: 29.4 % — SIGNIFICANT CHANGE UP (ref 20.5–51.1)
MCHC RBC-ENTMCNC: 27.4 PG — SIGNIFICANT CHANGE UP (ref 27–31)
MCHC RBC-ENTMCNC: 35.5 G/DL — SIGNIFICANT CHANGE UP (ref 32–37)
MCV RBC AUTO: 77 FL — LOW (ref 81–99)
MONOCYTES # BLD AUTO: 0.3 K/UL — SIGNIFICANT CHANGE UP (ref 0.1–0.6)
MONOCYTES NFR BLD AUTO: 3.4 % — SIGNIFICANT CHANGE UP (ref 1.7–9.3)
NEUTROPHILS # BLD AUTO: 5.73 K/UL — SIGNIFICANT CHANGE UP (ref 1.4–6.5)
NEUTROPHILS NFR BLD AUTO: 65.8 % — SIGNIFICANT CHANGE UP (ref 42.2–75.2)
NRBC # BLD: 0 /100 WBCS — SIGNIFICANT CHANGE UP (ref 0–0)
PLATELET # BLD AUTO: 238 K/UL — SIGNIFICANT CHANGE UP (ref 130–400)
POTASSIUM SERPL-MCNC: 5.1 MMOL/L — HIGH (ref 3.5–5)
POTASSIUM SERPL-SCNC: 5.1 MMOL/L — HIGH (ref 3.5–5)
PROT SERPL-MCNC: 7.6 G/DL — SIGNIFICANT CHANGE UP (ref 6–8)
PROTHROM AB SERPL-ACNC: 11.7 SEC — SIGNIFICANT CHANGE UP (ref 9.95–12.87)
RBC # BLD: 5.48 M/UL — HIGH (ref 4.2–5.4)
RBC # FLD: 14.4 % — SIGNIFICANT CHANGE UP (ref 11.5–14.5)
SODIUM SERPL-SCNC: 141 MMOL/L — SIGNIFICANT CHANGE UP (ref 135–146)
WBC # BLD: 8.73 K/UL — SIGNIFICANT CHANGE UP (ref 4.8–10.8)
WBC # FLD AUTO: 8.73 K/UL — SIGNIFICANT CHANGE UP (ref 4.8–10.8)

## 2022-05-03 PROCEDURE — 74177 CT ABD & PELVIS W/CONTRAST: CPT | Mod: 26,MA

## 2022-05-03 PROCEDURE — 71101 X-RAY EXAM UNILAT RIBS/CHEST: CPT | Mod: 26,LT

## 2022-05-03 PROCEDURE — 71046 X-RAY EXAM CHEST 2 VIEWS: CPT | Mod: 26,59

## 2022-05-03 PROCEDURE — 99284 EMERGENCY DEPT VISIT MOD MDM: CPT

## 2022-05-03 PROCEDURE — 71260 CT THORAX DX C+: CPT | Mod: 26,MA

## 2022-05-03 RX ORDER — OXYCODONE AND ACETAMINOPHEN 5; 325 MG/1; MG/1
1 TABLET ORAL
Qty: 8 | Refills: 0
Start: 2022-05-03 | End: 2022-05-05

## 2022-05-03 NOTE — ED PROVIDER NOTE - PHYSICAL EXAMINATION
Physical Exam    Vital Signs: I have reviewed the initial vital signs.  Constitutional: well-nourished, appears stated age, no acute distress  Eyes: Conjunctiva pink, Sclera clear, PERRLA, EOMI.  Cardiovascular: S1 and S2, regular rate, regular rhythm, well-perfused extremities, radial pulses equal and 2+  Respiratory: unlabored respiratory effort, clear to auscultation bilaterally no wheezing, rales and rhonchi  Gastrointestinal: soft, non-tender abdomen, no pulsatile mass, normal bowl sounds  Musculoskeletal: supple neck, no lower extremity edema, no midline tenderness. Tenderness to left lateral ribcage ribs 8-10. No ecchymosis.   Integumentary: warm, dry, no rash  Neurologic: awake, alert, cranial nerves II-XII grossly intact, extremities’ motor and sensory functions grossly intact  Psychiatric: appropriate mood, appropriate affect

## 2022-05-03 NOTE — ED ADULT NURSE NOTE - NSICDXPASTMEDICALHX_GEN_ALL_CORE_FT
PAST MEDICAL HISTORY:  HLD (hyperlipidemia)     Hypertension, unspecified type     Mood disorder

## 2022-05-03 NOTE — ED PROVIDER NOTE - ATTENDING APP SHARED VISIT CONTRIBUTION OF CARE
62-year-old female with h/o HTN, in ER for evaluation of left lower rib pain s/p fall 9 days ago.  Patient states her knee buckled, she lost her balance, and fell onto ground.  Did not hit head, no LOC.  Patient has been having pain to left lower chest and left flank since that time.  Pain worse with movements and deep breaths.  No dizziness/HA.  No abdominal pain.  No N/V.   denies any extremity injury.  Not on any AC.  PE - nad, nc/at, eomi, perrl, op - clear, mmm, neck supple, cta b/l, no w/r/r, rrr, + L lower/lateral CW tenderness, no crepitus, abd- soft, nt/nd, nabs, from x 4, no LE swelling/tenderness, A&O x 3, cn 2-12 intact, no focal motor/sensory deficits.   -CT chest/abd

## 2022-05-03 NOTE — ED PROVIDER NOTE - OBJECTIVE STATEMENT
62 year old female with a history of HTN presents with left lateral rib pain s/p mechanical fall at home 2 days ago. Patient states that she tripped in her home accidentally landing on her left side. Denies head trauma, loss of consciousness, and denies use of AC. Since the incident she has pain of the left rib cage worse with movement and deep inspiration. Denies chest pain, shortness of breath and further musculoskeletal injury.

## 2022-05-03 NOTE — ED PROVIDER NOTE - PATIENT PORTAL LINK FT
You can access the FollowMyHealth Patient Portal offered by [facility] by registering at the following website: [email or web address]. By joining [employer]’s FollowMyHealth portal, you will also be able to view your health information using other applications (apps) compatible with our system.

## 2022-05-03 NOTE — ED PROVIDER NOTE - NS ED ATTENDING STATEMENT MOD
This was a shared visit with the TANYA. I reviewed and verified the documentation and independently performed the documented:

## 2022-05-03 NOTE — ED PROVIDER NOTE - NS ED ROS FT
Constitutional: (-) fever  Eyes/ENT: (-) blurry vision, (-) epistaxis  Cardiovascular: (-) chest pain, (-) syncope  Respiratory: (-) cough, (-) shortness of breath  Gastrointestinal: (-) vomiting, (-) diarrhea  Musculoskeletal: (-) neck pain, (-) back pain, (-) joint pain (+) left rib cage pain   Integumentary: (-) rash, (-) edema  Neurological: (-) headache, (-) altered mental status  Psychiatric: (-) hallucinations  Allergic/Immunologic: (-) pruritus

## 2022-05-03 NOTE — ED ADULT TRIAGE NOTE - CHIEF COMPLAINT QUOTE
Pt  fell last week. denies LOC. denies blood thinners. complaining of left sided rib pain and pain on inspiration. also complaining of periods of SOB Pt  fell last week. denies LOC. denies blood thinners. complaining of left sided rib pain and pain on inspiration. also complaining of periods of SOB. MD assessed breath sounds.

## 2022-05-03 NOTE — ED ADULT NURSE NOTE - CHIEF COMPLAINT QUOTE
Pt  fell last week. denies LOC. denies blood thinners. complaining of left sided rib pain and pain on inspiration. also complaining of periods of SOB. MD assessed breath sounds.

## 2022-05-03 NOTE — ED PROVIDER NOTE - PROGRESS NOTE DETAILS
Labs reviewed, CT chest/abdomen/pelvis significant for a nondisplaced left 10th lateral rib fracture.  No pneumothorax.  Patient well-appearing in ER, results discussed with patient.  Injury occurred 9 days ago.  To DC home with incentive spirometer, pain medication, and to follow-up with PMD.  Patient told to return to ER for worsening pain, difficulty breathing, fever, or any other new/concerning symptoms.  Patient understands and agrees with plan.

## 2022-05-03 NOTE — ED PROVIDER NOTE - NSFOLLOWUPINSTRUCTIONS_ED_ALL_ED_FT
Rib Fracture    WHAT YOU NEED TO KNOW:    A rib fracture is a crack or break in a rib bone. Rib fractures usually heal within 6 weeks. You should be able to return to normal activities before that time. Do not wrap anything around your body to try to splint your ribs. This can prevent you from taking deep breaths and increases your risk for pneumonia.Rib Cage         DISCHARGE INSTRUCTIONS:    Call 911 for any of the following:     You have trouble breathing.      You have new or increased pain.    Return to the emergency department if:     Your pain does not get better, even after treatment.      You have a fever or a cough.     Contact your healthcare provider if:     You have questions or concerns about your condition or care.        Medicines:     NSAIDs help decrease swelling and pain. NSAIDs are available without a doctor's order. Ask your healthcare provider which medicine is right for you. Ask how much to take and when to take it. Take as directed. NSAIDs can cause stomach bleeding and kidney problems if not taken correctly.      Prescription pain medicine may be given. Ask your healthcare provider how to take this medicine safely. Some prescription pain medicines contain acetaminophen. Do not take other medicines that contain acetaminophen without talking to your healthcare provider. Too much acetaminophen may cause liver damage. Prescription pain medicine may cause constipation. Ask your healthcare provider how to prevent or treat constipation.       Take your medicine as directed. Contact your healthcare provider if you think your medicine is not helping or if you have side effects. Tell him or her if you are allergic to any medicine. Keep a list of the medicines, vitamins, and herbs you take. Include the amounts, and when and why you take them. Bring the list or the pill bottles to follow-up visits. Carry your medicine list with you in case of an emergency.    Follow up with your healthcare provider as directed: Write down your questions so you remember to ask them during your visits.    Deep breathing: Deep breathing will decrease your risk for pneumonia. Hug a pillow on the injured side while doing this exercise, to decrease pain. Take a deep breath and hold it for as long as possible. You should let the air out and then cough strongly. Deep breaths help open your airway. You may be given an incentive spirometer to help take deep breaths. Put the plastic piece in your mouth. Take a slow, deep breath. You should then let the air out and cough. Repeat these steps 10 times every hour.    Rest: Rest and limit activity to decrease swelling and pain, and allow your injury to heal. Avoid activities that may cause more pain or damage to your ribs such as, pulling, pushing, and lifting. As your pain decreases, begin movements slowly. Take short walks between rest periods.    Ice: Apply ice on the fractured area for 15 to 20 minutes every hour or as directed. Use an ice pack or put crushed ice in a plastic bag. Cover it with a towel. Ice helps prevent tissue damage and decreases swelling and pain.       © Copyright [employer] 2019 All illustrations and images included in CareNotes are the copyrighted property of [employer] Inc[employer] or [employer]

## 2022-10-04 ENCOUNTER — TRANSCRIPTION ENCOUNTER (OUTPATIENT)
Age: 62
End: 2022-10-04

## 2022-10-04 ENCOUNTER — OUTPATIENT (OUTPATIENT)
Dept: OUTPATIENT SERVICES | Facility: HOSPITAL | Age: 62
LOS: 1 days | Discharge: HOME | End: 2022-10-04

## 2022-10-04 VITALS — RESPIRATION RATE: 18 BRPM | SYSTOLIC BLOOD PRESSURE: 122 MMHG | DIASTOLIC BLOOD PRESSURE: 72 MMHG | HEART RATE: 64 BPM

## 2022-10-04 VITALS
RESPIRATION RATE: 17 BRPM | SYSTOLIC BLOOD PRESSURE: 175 MMHG | HEIGHT: 64 IN | OXYGEN SATURATION: 98 % | WEIGHT: 199.96 LBS | DIASTOLIC BLOOD PRESSURE: 93 MMHG | TEMPERATURE: 98 F | HEART RATE: 61 BPM

## 2022-10-04 RX ORDER — ESCITALOPRAM OXALATE 10 MG/1
1 TABLET, FILM COATED ORAL
Qty: 0 | Refills: 0 | DISCHARGE

## 2022-10-04 RX ORDER — LOSARTAN POTASSIUM 100 MG/1
0 TABLET, FILM COATED ORAL
Qty: 0 | Refills: 0 | DISCHARGE

## 2022-10-04 RX ORDER — ATORVASTATIN CALCIUM 80 MG/1
1 TABLET, FILM COATED ORAL
Qty: 0 | Refills: 0 | DISCHARGE

## 2022-10-04 RX ORDER — NIFEDIPINE 30 MG
0 TABLET, EXTENDED RELEASE 24 HR ORAL
Qty: 0 | Refills: 0 | DISCHARGE

## 2022-10-04 NOTE — ASU DISCHARGE PLAN (ADULT/PEDIATRIC) - NS MD DC FALL RISK RISK
For information on Fall & Injury Prevention, visit: [email or web address] OR  [email or web address] OR  https://www.cdc.gov/steadi/patient.html

## 2022-10-06 DIAGNOSIS — H26.9 UNSPECIFIED CATARACT: ICD-10-CM

## 2022-10-06 DIAGNOSIS — H40.10X0 UNSPECIFIED OPEN-ANGLE GLAUCOMA, STAGE UNSPECIFIED: ICD-10-CM

## 2024-03-01 NOTE — ED PROVIDER NOTE - NSCAREINITIATED _GEN_ER
Cardiology initial    [facility]  [address] [facility] [place] [address]  Herberth Tijerina MD        Assessment     Diagnoses and all orders for this visit:  Essential hypertension  -     Comprehensive Metabolic Panel; Future  -     Lipid Panel With Reflex; Future  -     Glycohemoglobin; Future  Mixed hyperlipidemia  -     Comprehensive Metabolic Panel; Future  -     Lipid Panel With Reflex; Future  -     Glycohemoglobin; Future  Hypertensive kidney disease with stage 3a chronic kidney disease (CMD)  -     Comprehensive Metabolic Panel; Future  -     Lipid Panel With Reflex; Future  -     Glycohemoglobin; Future  Palpitations  -     Comprehensive Metabolic Panel; Future  -     Lipid Panel With Reflex; Future  -     Glycohemoglobin; Future  Other orders  -     rosuvastatin (CRESTOR) 40 MG tablet; Take 1 tablet by mouth daily.        Orders Placed This Encounter    Comprehensive Metabolic Panel    Lipid Panel With Reflex    Glycohemoglobin    rosuvastatin (CRESTOR) 40 MG tablet         Assesment and Plan:      HTN  Controlled but he is having some orthostatic changes.  He is given the 200 range now in the 120 range.  Much better with current regimen.  We discussed at length episodes of mild orthostasis.  Will try to minimize them but keep a close eye on blood pressure.  Plan is to stop nifedipine and monitor symptoms as well as blood pressure.  Blood pressure elevates then restart nifedipine half a dose otherwise full dose where he is currently at.      Palpitations   Resolved.  Continue current management.    HLP   On statins   Could be better controlled.  Will switch him to Crestor.  Recheck lipids prior to next visit.      Cardiac Imaging:      10/1/2015 ECHO at OSH   Left Ventricle   The left ventricle is normal in size, wall motion and contractility. Left   ventricular wall thickness is not grossly increased.   Right Ventricle   The right ventricle is grossly normal in size, wall  Omid Manzanares(PA) motion and   contractility.   Right Atrium   The right atrium is mildly dilated.   Left Atrium   The left atrium is not grossly dilated.   Mitral Valve   The mitral valve is unremarkable in appearance. Mild mitral regurgitation is   present. The early diastolic velocities of the septal and lateral aspects of   the mitral annulus are 7.9 and 11.2 cm per second, respectively.   Aortic Valve   The aortic valve is trileaflet; its leaflets are mildly thickened and open   adequately. Mild aortic regurgitation is present.   Tricuspid Valve   Trace tricuspid regurgitation is present.   Pulmonic Valve   There is no obvious pulmonic regurgitation.   Aorta   The aortic root is normal in size.   Pericardium   There is no significant pericardial effusion.        Holter 2/13/23 Normal   ECHO 2/2/23  Left ventricle: The cavity size is normal. Wall thickness is mildly     increased. There is concentric hypertrophy. Systolic function is normal.     The ejection fraction was measured by biplane method of disks. Doppler     parameters are consistent with abnormal left ventricular relaxation (grade     1 diastolic dysfunction). The ejection fraction is 64%.  2. Aortic valve: There is mild regurgitation.  3. Right ventricle: The cavity size is normal. Wall thickness is normal.     Systolic function is normal.        Referral Provider: Manuel Maldonado MD    HPI:  82-year-old male very active with past medical history of dyslipidemia, hypertension, palpitations in the past.  Patient is here today for follow up     Since have seen him for the first time his blood pressure is much better controlled currently at target.  Nonetheless his symptoms are now of mild orthostasis.  He does feel little bit lightheaded for a couple seconds when standing up or switching positions.  No presyncopal syncopal event.    Have decided to stop his nifedipine and monitor symptoms.  His blood pressure to be elevated he will start half a pill first and  subsequently full pill.    Cholesterol could be better.  Switching him to Crestor.  Otherwise continue current meds follow-up with us in 6 months.  Recheck lipids in 4 months      Family hx:  Non contributory     Social Hx:  Non smoker   Retired     The ASCVD Risk score (Rupali VU, et al., 2019) failed to calculate for the following reasons:    The 2019 ASCVD risk score is only valid for ages 40 to 79        Labs:  Cholesterol (mg/dL)   Date Value   12/06/2023 175     HDL (mg/dL)   Date Value   12/06/2023 54     Cholesterol/ HDL Ratio (no units)   Date Value   12/06/2023 3.2     Triglycerides (mg/dL)   Date Value   12/06/2023 143     LDL (mg/dL)   Date Value   12/06/2023 92        No results found for: \"HGBA1C\"     WBC (K/mcL)   Date Value   06/19/2023 6.8     RBC (mil/mcL)   Date Value   06/19/2023 4.89     HCT (%)   Date Value   06/19/2023 45.4     HGB (g/dL)   Date Value   06/19/2023 15.5     PLT (K/mcL)   Date Value   06/19/2023 229        Sodium (mmol/L)   Date Value   12/06/2023 140     Potassium (mmol/L)   Date Value   12/06/2023 4.2     Chloride (mmol/L)   Date Value   12/06/2023 105     Glucose (mg/dL)   Date Value   12/06/2023 90     Calcium (mg/dL)   Date Value   12/06/2023 9.4     Carbon Dioxide (mmol/L)   Date Value   12/06/2023 28     BUN (mg/dL)   Date Value   12/06/2023 27 (H)     Creatinine (mg/dL)   Date Value   12/06/2023 1.60 (H)        GOT/AST (Units/L)   Date Value   12/06/2023 23     GPT/ALT (Units/L)   Date Value   12/06/2023 24     No results found for: \"GGTP\"  Alkaline Phosphatase (Units/L)   Date Value   12/06/2023 48     Bilirubin, Total (mg/dL)   Date Value   12/06/2023 0.7        Imaging:  No results found.      Subjective     Patient ID: Ludy is a 82 year old male.    Chief Complaint   Patient presents with    Office Visit    Follow-up     6m         Past Medical History:   Diagnosis Date    Enlarged prostate     Gastroesophageal reflux disease     Hypertension     Osteoarthritis       Social History     Tobacco Use    Smoking status: Never    Smokeless tobacco: Never   Substance Use Topics    Alcohol use: Yes     Alcohol/week: 1.0 standard drink of alcohol     Types: 1 Glasses of wine per week    Drug use: Never     Current Outpatient Medications   Medication Sig Dispense Refill    rosuvastatin (CRESTOR) 40 MG tablet Take 1 tablet by mouth daily. 90 tablet 3    NIFEdipine CC (ADALAT CC) 30 MG 24 hr tablet Take 1 tablet by mouth daily. 30 tablet 3    fenofibrate 160 MG tablet TAKE 1 TABLET BY MOUTH DAILY 90 tablet 3    finasteride (PROSCAR) 5 MG tablet TAKE 1 TABLET BY MOUTH EVERY EVENING 90 tablet 3    tamsulosin (FLOMAX) 0.4 MG Cap TAKE 1 CAPSULE BY MOUTH EVERY DAY 90 capsule 3    losartan (COZAAR) 100 MG tablet Take 1 tablet by mouth daily. 90 tablet 3    carvedilol (Coreg) 6.25 MG tablet Take 1 tablet by mouth in the morning and 1 tablet in the evening. Take with meals. 60 tablet 11     No current facility-administered medications for this visit.     Family History   Problem Relation Age of Onset    Hypertension Mother     Hypertension Father      ALLERGIES:  Lisinopril      Review of Systems   12 point review of systems done, all negative except per HPI.    Vitals:    03/01/24 1254   BP: 127/66   BP Location: LUE - Left upper extremity   Patient Position: Sitting   Cuff Size: Regular   Pulse: (!) 53   SpO2: 95%   Weight: 78.1 kg (172 lb 2.9 oz)   Height: 5' 10\" (1.778 m)   PainSc:  0          Wt Readings from Last 4 Encounters:   03/01/24 78.1 kg (172 lb 2.9 oz)   12/06/23 79.4 kg (175 lb 0.7 oz)   08/02/23 81.1 kg (178 lb 12.7 oz)   05/09/23 79 kg (174 lb 2.6 oz)         Physical Exam  Constitutional:       Appearance: He is well-developed.   HENT:      Head: Normocephalic.   Eyes:      Pupils: Pupils are equal, round, and reactive to light.   Neck:      Thyroid: No thyromegaly.      Vascular: No JVD.   Cardiovascular:      Rate and Rhythm: Normal rate and regular rhythm.      Pulses:  Intact distal pulses.      Heart sounds:      No friction rub. No gallop.   Pulmonary:      Effort: Pulmonary effort is normal. No respiratory distress.      Breath sounds: Normal breath sounds. No wheezing or rales.   Chest:      Chest wall: No tenderness.   Abdominal:      General: Bowel sounds are normal. There is no distension.      Palpations: Abdomen is soft. There is no mass.      Tenderness: There is no abdominal tenderness.   Musculoskeletal:         General: Normal range of motion.      Cervical back: Normal range of motion.   Skin:     General: Skin is warm and dry.      Coloration: Skin is not pale.      Findings: No erythema.   Neurological:      Mental Status: He is alert and oriented to person, place, and time.           Herberth Tijerina MD   Interventional Cardiology       Disclaimer: this document  was dictated using Dragon Voice Recognition Software. Rapid proofreading  was performed to expedite delivery of information. Phonetic errors will occur, which are common with voice recognition software. If there is concern about meaning or content, please contact our office    The total amount of time spent in the care of this patient ( this includes. but is not limited to,   face-to-face interaction, imaging and chart review, discussion with other physicians and member of the health care team and coordination of care) is 45 minutes.   Spent greater than 50% of time with counseling and education. counseling regarding importance of medication compliance, importance of optimal diabetes management, importance of optimal blood pressure management, and importance to abstain and or quit smoking as well as review of past medical records and cardiovascular work.

## 2024-04-29 ENCOUNTER — APPOINTMENT (OUTPATIENT)
Dept: CARDIOLOGY | Facility: CLINIC | Age: 64
End: 2024-04-29

## 2024-07-25 NOTE — ED ADULT NURSE NOTE - PRIMARY CARE PROVIDER
YUDY GAUTHIER [facility]  [address], [place] [address]    COLON DISCHARGE INSTRUCTIONS    Elvia Dickey  [id number]  1955    Discomfort:  Redness at IV site- apply warm compress to area; if redness or soreness persist- contact your physician  There may be a slight amount of blood passed from the rectum  Gaseous discomfort- walking, belching will help relieve any discomfort  You may not operate a vehicle for 12 hours  You may not engage in an occupation involving machinery or appliances for rest of today  You may not drink alcoholic beverages for at least 12 hours  Avoid making any critical decisions for at least 24 hour  DIET:  You may resume your regular diet - however -  remember your colon is empty and a heavy meal will produce gas.  Avoid these foods:  vegetables, fried / greasy foods, carbonated drinks     ACTIVITY:  You may  resume your normal daily activities it is recommended that you spend the remainder of the day resting -  avoid any strenuous activity.    CALL M.D.  ANY SIGN OF:   Increasing pain, nausea, vomiting  Abdominal distension (swelling)  New increased bleeding (oral or rectal)  Fever (chills)  Pain in chest area  Bloody discharge from nose or mouth  Shortness of breath      Follow-up Instructions:   Call Dr. Uriah Mayfield for any questions or problems at [phone]          ENDOSCOPY FINDINGS:   Your colonoscopy showed no polyps. Your next colonoscopy will be due in 10 years.  Telephone # [phone]      Signed By: Uriah Mayfield MD     7/25/2024  10:19 AM         PMD

## 2024-09-10 ENCOUNTER — APPOINTMENT (OUTPATIENT)
Dept: OTOLARYNGOLOGY | Facility: CLINIC | Age: 64
End: 2024-09-10

## 2025-01-09 ENCOUNTER — INPATIENT (INPATIENT)
Facility: HOSPITAL | Age: 65
LOS: 3 days | Discharge: HOME HEALTH | DRG: 45 | End: 2025-01-13
Attending: INTERNAL MEDICINE | Admitting: INTERNAL MEDICINE
Payer: MEDICAID

## 2025-01-09 VITALS
HEIGHT: 60 IN | SYSTOLIC BLOOD PRESSURE: 143 MMHG | WEIGHT: 240.08 LBS | HEART RATE: 100 BPM | DIASTOLIC BLOOD PRESSURE: 88 MMHG | TEMPERATURE: 99 F | OXYGEN SATURATION: 100 % | RESPIRATION RATE: 18 BRPM

## 2025-01-09 DIAGNOSIS — R41.82 ALTERED MENTAL STATUS, UNSPECIFIED: ICD-10-CM

## 2025-01-09 LAB
ALBUMIN SERPL ELPH-MCNC: 4.1 G/DL — SIGNIFICANT CHANGE UP (ref 3.5–5.2)
ALP SERPL-CCNC: 78 U/L — SIGNIFICANT CHANGE UP (ref 30–115)
ALT FLD-CCNC: 10 U/L — SIGNIFICANT CHANGE UP (ref 0–41)
AMPHET UR-MCNC: NEGATIVE — SIGNIFICANT CHANGE UP
ANION GAP SERPL CALC-SCNC: 12 MMOL/L — SIGNIFICANT CHANGE UP (ref 7–14)
APPEARANCE UR: CLEAR — SIGNIFICANT CHANGE UP
APTT BLD: 28.2 SEC — SIGNIFICANT CHANGE UP (ref 27–39.2)
AST SERPL-CCNC: 16 U/L — SIGNIFICANT CHANGE UP (ref 0–41)
BARBITURATES UR SCN-MCNC: NEGATIVE — SIGNIFICANT CHANGE UP
BASOPHILS # BLD AUTO: 0.03 K/UL — SIGNIFICANT CHANGE UP (ref 0–0.2)
BASOPHILS NFR BLD AUTO: 0.3 % — SIGNIFICANT CHANGE UP (ref 0–1)
BENZODIAZ UR-MCNC: NEGATIVE — SIGNIFICANT CHANGE UP
BILIRUB SERPL-MCNC: 0.6 MG/DL — SIGNIFICANT CHANGE UP (ref 0.2–1.2)
BILIRUB UR-MCNC: NEGATIVE — SIGNIFICANT CHANGE UP
BUN SERPL-MCNC: 20 MG/DL — SIGNIFICANT CHANGE UP (ref 10–20)
CALCIUM SERPL-MCNC: 9.3 MG/DL — SIGNIFICANT CHANGE UP (ref 8.4–10.5)
CHLORIDE SERPL-SCNC: 99 MMOL/L — SIGNIFICANT CHANGE UP (ref 98–110)
CK SERPL-CCNC: 134 U/L — SIGNIFICANT CHANGE UP (ref 0–225)
CO2 SERPL-SCNC: 27 MMOL/L — SIGNIFICANT CHANGE UP (ref 17–32)
COCAINE METAB.OTHER UR-MCNC: NEGATIVE — SIGNIFICANT CHANGE UP
COLOR SPEC: YELLOW — SIGNIFICANT CHANGE UP
CREAT SERPL-MCNC: 1.2 MG/DL — SIGNIFICANT CHANGE UP (ref 0.7–1.5)
DIFF PNL FLD: NEGATIVE — SIGNIFICANT CHANGE UP
EGFR: 51 ML/MIN/1.73M2 — LOW
EOSINOPHIL # BLD AUTO: 0.12 K/UL — SIGNIFICANT CHANGE UP (ref 0–0.7)
EOSINOPHIL NFR BLD AUTO: 1.1 % — SIGNIFICANT CHANGE UP (ref 0–8)
FENTANYL UR QL: NEGATIVE — SIGNIFICANT CHANGE UP
FLUAV AG NPH QL: SIGNIFICANT CHANGE UP
FLUBV AG NPH QL: SIGNIFICANT CHANGE UP
GLUCOSE SERPL-MCNC: 159 MG/DL — HIGH (ref 70–99)
GLUCOSE UR QL: NEGATIVE MG/DL — SIGNIFICANT CHANGE UP
HCT VFR BLD CALC: 41.3 % — SIGNIFICANT CHANGE UP (ref 37–47)
HGB BLD-MCNC: 14.6 G/DL — SIGNIFICANT CHANGE UP (ref 12–16)
IMM GRANULOCYTES NFR BLD AUTO: 0.5 % — HIGH (ref 0.1–0.3)
INR BLD: 1 RATIO — SIGNIFICANT CHANGE UP (ref 0.65–1.3)
KETONES UR-MCNC: NEGATIVE MG/DL — SIGNIFICANT CHANGE UP
LEUKOCYTE ESTERASE UR-ACNC: NEGATIVE — SIGNIFICANT CHANGE UP
LYMPHOCYTES # BLD AUTO: 1.06 K/UL — LOW (ref 1.2–3.4)
LYMPHOCYTES # BLD AUTO: 9.7 % — LOW (ref 20.5–51.1)
MCHC RBC-ENTMCNC: 27.5 PG — SIGNIFICANT CHANGE UP (ref 27–31)
MCHC RBC-ENTMCNC: 35.4 G/DL — SIGNIFICANT CHANGE UP (ref 32–37)
MCV RBC AUTO: 77.9 FL — LOW (ref 81–99)
METHADONE UR-MCNC: NEGATIVE — SIGNIFICANT CHANGE UP
MONOCYTES # BLD AUTO: 0.55 K/UL — SIGNIFICANT CHANGE UP (ref 0.1–0.6)
MONOCYTES NFR BLD AUTO: 5 % — SIGNIFICANT CHANGE UP (ref 1.7–9.3)
NEUTROPHILS # BLD AUTO: 9.11 K/UL — HIGH (ref 1.4–6.5)
NEUTROPHILS NFR BLD AUTO: 83.4 % — HIGH (ref 42.2–75.2)
NITRITE UR-MCNC: NEGATIVE — SIGNIFICANT CHANGE UP
NRBC # BLD: 0 /100 WBCS — SIGNIFICANT CHANGE UP (ref 0–0)
OPIATES UR-MCNC: NEGATIVE — SIGNIFICANT CHANGE UP
PCP SPEC-MCNC: SIGNIFICANT CHANGE UP
PH UR: 7.5 — SIGNIFICANT CHANGE UP (ref 5–8)
PLATELET # BLD AUTO: 196 K/UL — SIGNIFICANT CHANGE UP (ref 130–400)
PMV BLD: 11.8 FL — HIGH (ref 7.4–10.4)
POTASSIUM SERPL-MCNC: 3.9 MMOL/L — SIGNIFICANT CHANGE UP (ref 3.5–5)
POTASSIUM SERPL-SCNC: 3.9 MMOL/L — SIGNIFICANT CHANGE UP (ref 3.5–5)
PROPOXYPHENE QUALITATIVE URINE RESULT: NEGATIVE — SIGNIFICANT CHANGE UP
PROT SERPL-MCNC: 6.9 G/DL — SIGNIFICANT CHANGE UP (ref 6–8)
PROT UR-MCNC: NEGATIVE MG/DL — SIGNIFICANT CHANGE UP
PROTHROM AB SERPL-ACNC: 11.8 SEC — SIGNIFICANT CHANGE UP (ref 9.95–12.87)
RBC # BLD: 5.3 M/UL — SIGNIFICANT CHANGE UP (ref 4.2–5.4)
RBC # FLD: 14.4 % — SIGNIFICANT CHANGE UP (ref 11.5–14.5)
RSV RNA NPH QL NAA+NON-PROBE: DETECTED
SARS-COV-2 RNA SPEC QL NAA+PROBE: SIGNIFICANT CHANGE UP
SODIUM SERPL-SCNC: 138 MMOL/L — SIGNIFICANT CHANGE UP (ref 135–146)
SP GR SPEC: 1.02 — SIGNIFICANT CHANGE UP (ref 1–1.03)
TROPONIN T, HIGH SENSITIVITY RESULT: 7 NG/L — SIGNIFICANT CHANGE UP (ref 6–13)
UROBILINOGEN FLD QL: 1 MG/DL — SIGNIFICANT CHANGE UP (ref 0.2–1)
WBC # BLD: 10.92 K/UL — HIGH (ref 4.8–10.8)
WBC # FLD AUTO: 10.92 K/UL — HIGH (ref 4.8–10.8)

## 2025-01-09 PROCEDURE — 99222 1ST HOSP IP/OBS MODERATE 55: CPT

## 2025-01-09 PROCEDURE — 80048 BASIC METABOLIC PNL TOTAL CA: CPT

## 2025-01-09 PROCEDURE — 74018 RADEX ABDOMEN 1 VIEW: CPT

## 2025-01-09 PROCEDURE — 99223 1ST HOSP IP/OBS HIGH 75: CPT

## 2025-01-09 PROCEDURE — 83735 ASSAY OF MAGNESIUM: CPT

## 2025-01-09 PROCEDURE — 80053 COMPREHEN METABOLIC PANEL: CPT

## 2025-01-09 PROCEDURE — 83036 HEMOGLOBIN GLYCOSYLATED A1C: CPT

## 2025-01-09 PROCEDURE — 97162 PT EVAL MOD COMPLEX 30 MIN: CPT | Mod: GP

## 2025-01-09 PROCEDURE — 70498 CT ANGIOGRAPHY NECK: CPT | Mod: 26

## 2025-01-09 PROCEDURE — 82607 VITAMIN B-12: CPT

## 2025-01-09 PROCEDURE — 95819 EEG AWAKE AND ASLEEP: CPT

## 2025-01-09 PROCEDURE — 70551 MRI BRAIN STEM W/O DYE: CPT | Mod: MC

## 2025-01-09 PROCEDURE — 80354 DRUG SCREENING FENTANYL: CPT

## 2025-01-09 PROCEDURE — 0241U: CPT

## 2025-01-09 PROCEDURE — 70496 CT ANGIOGRAPHY HEAD: CPT | Mod: 26

## 2025-01-09 PROCEDURE — 99285 EMERGENCY DEPT VISIT HI MDM: CPT | Mod: 25

## 2025-01-09 PROCEDURE — 71045 X-RAY EXAM CHEST 1 VIEW: CPT | Mod: 26

## 2025-01-09 PROCEDURE — 84443 ASSAY THYROID STIM HORMONE: CPT

## 2025-01-09 PROCEDURE — 82550 ASSAY OF CK (CPK): CPT

## 2025-01-09 PROCEDURE — 82746 ASSAY OF FOLIC ACID SERUM: CPT

## 2025-01-09 PROCEDURE — 0042T: CPT

## 2025-01-09 PROCEDURE — 85025 COMPLETE CBC W/AUTO DIFF WBC: CPT

## 2025-01-09 PROCEDURE — 36415 COLL VENOUS BLD VENIPUNCTURE: CPT

## 2025-01-09 PROCEDURE — 94640 AIRWAY INHALATION TREATMENT: CPT

## 2025-01-09 PROCEDURE — 93005 ELECTROCARDIOGRAM TRACING: CPT

## 2025-01-09 PROCEDURE — 70450 CT HEAD/BRAIN W/O DYE: CPT | Mod: 26,MC

## 2025-01-09 PROCEDURE — 80307 DRUG TEST PRSMV CHEM ANLYZR: CPT

## 2025-01-09 PROCEDURE — 97166 OT EVAL MOD COMPLEX 45 MIN: CPT | Mod: GO

## 2025-01-09 PROCEDURE — 80061 LIPID PANEL: CPT

## 2025-01-09 PROCEDURE — 83605 ASSAY OF LACTIC ACID: CPT

## 2025-01-09 PROCEDURE — 86780 TREPONEMA PALLIDUM: CPT

## 2025-01-09 RX ORDER — ATORVASTATIN CALCIUM 40 MG/1
40 TABLET, FILM COATED ORAL AT BEDTIME
Refills: 0 | Status: DISCONTINUED | OUTPATIENT
Start: 2025-01-09 | End: 2025-01-10

## 2025-01-09 RX ORDER — NIFEDIPINE 60 MG/1
90 TABLET, EXTENDED RELEASE ORAL DAILY
Refills: 0 | Status: DISCONTINUED | OUTPATIENT
Start: 2025-01-09 | End: 2025-01-13

## 2025-01-09 RX ORDER — ASPIRIN 81 MG
81 TABLET, DELAYED RELEASE (ENTERIC COATED) ORAL DAILY
Refills: 0 | Status: DISCONTINUED | OUTPATIENT
Start: 2025-01-09 | End: 2025-01-13

## 2025-01-09 RX ORDER — LATANOPROST 50 UG/ML
1 SOLUTION OPHTHALMIC DAILY
Refills: 0 | Status: DISCONTINUED | OUTPATIENT
Start: 2025-01-09 | End: 2025-01-13

## 2025-01-09 RX ORDER — LOSARTAN POTASSIUM 100 MG/1
100 TABLET, FILM COATED ORAL DAILY
Refills: 0 | Status: DISCONTINUED | OUTPATIENT
Start: 2025-01-09 | End: 2025-01-13

## 2025-01-09 RX ORDER — DORZOLAMIDE/TIMOLOL/PF 2 %-0.5 %
1 DROPS OPHTHALMIC (EYE)
Refills: 0 | DISCHARGE

## 2025-01-09 RX ORDER — ACETAMINOPHEN 80 MG/.8ML
650 SOLUTION/ DROPS ORAL EVERY 6 HOURS
Refills: 0 | Status: DISCONTINUED | OUTPATIENT
Start: 2025-01-09 | End: 2025-01-13

## 2025-01-09 RX ORDER — LATANOPROST 50 UG/ML
1 SOLUTION OPHTHALMIC
Refills: 0 | DISCHARGE

## 2025-01-09 RX ORDER — TIMOLOL MALEATE 0.5 %
1 DROPS OPHTHALMIC (EYE)
Refills: 0 | Status: DISCONTINUED | OUTPATIENT
Start: 2025-01-09 | End: 2025-01-13

## 2025-01-09 RX ORDER — PANTOPRAZOLE 40 MG/1
40 TABLET, DELAYED RELEASE ORAL
Refills: 0 | Status: DISCONTINUED | OUTPATIENT
Start: 2025-01-09 | End: 2025-01-13

## 2025-01-09 RX ORDER — DORZOLAMIDE/TIMOLOL/PF 2 %-0.5 %
1 DROPS OPHTHALMIC (EYE)
Refills: 0 | Status: DISCONTINUED | OUTPATIENT
Start: 2025-01-09 | End: 2025-01-09

## 2025-01-09 RX ADMIN — ACETAMINOPHEN 650 MILLIGRAM(S): 80 SOLUTION/ DROPS ORAL at 15:20

## 2025-01-09 RX ADMIN — ATORVASTATIN CALCIUM 40 MILLIGRAM(S): 40 TABLET, FILM COATED ORAL at 21:35

## 2025-01-09 NOTE — H&P ADULT - HISTORY OF PRESENT ILLNESS
64-year-old female with PMHx of HTN, HLD presents today for altered mental status. Per family patient was found in urine on bathroom floor, last known well was 10 PM when they saw her prior to going to bed. Patient normally A&O x 3,    ED Vitals:  T(C): 36.7 (01-09-25 @ 07:30), Max: 37 (01-09-25 @ 06:14)  HR: 92 (01-09-25 @ 07:30) (92 - 100)  BP: 140/80 (01-09-25 @ 07:30) (140/80 - 143/88)  RR: 20 (01-09-25 @ 07:30) (18 - 20)  SpO2: 100% (01-09-25 @ 07:30) (100% - 100%)    Labs:  WBC 10.92, Hgb 14.6, MVC 77.9  Na 138, K 3.9  Glu 159  Trop T 51    UA Negative    Imaging:  CT PERFUSION: Artifactual perfusion abnormality in the right frontal region, otherwise   no perfusion deficits to suggest completed infarction or at risk territory.    CTA HEAD/NECK: No large vessel occlusion, aneurysm, or vascular malformation.  Focal mild stenosis of the right A2 JORGE A.  Focal high-grade stenosis in the left P2 PCA.    CXR: No radiographic evidence of acute cardiopulmonary disease.      S/p neurology eval.  Out of the window for IV thrombolytics. NIHSS 1 for non disabling signs not a candidate for IA intervention.      Admitted to medicine for further management.  64-year-old female with PMHx of HTN, HLD presents today for altered mental status. Per family patient was found in urine on bathroom floor, last known well was 10 PM when they saw her prior to going to bed. Patient A&O x 3 at baseline. Denies fever, chills, chest pain, SOB, abdominal pain, N/V/D.    ED Vitals:  T(C): 36.7 (01-09-25 @ 07:30), Max: 37 (01-09-25 @ 06:14)  HR: 92 (01-09-25 @ 07:30) (92 - 100)  BP: 140/80 (01-09-25 @ 07:30) (140/80 - 143/88)  RR: 20 (01-09-25 @ 07:30) (18 - 20)  SpO2: 100% (01-09-25 @ 07:30) (100% - 100%)    Labs:  WBC 10.92, Hgb 14.6, MVC 77.9  Na 138, K 3.9  Glu 159  Trop T 51    UA Negative    Imaging:  CT PERFUSION: Artifactual perfusion abnormality in the right frontal region, otherwise   no perfusion deficits to suggest completed infarction or at risk territory.    CTA HEAD/NECK: No large vessel occlusion, aneurysm, or vascular malformation.  Focal mild stenosis of the right A2 JORGE A.  Focal high-grade stenosis in the left P2 PCA.    CXR: No radiographic evidence of acute cardiopulmonary disease.      S/p neurology eval.  Out of the window for IV thrombolytics. NIHSS 1 for non disabling signs not a candidate for IA intervention.      Admitted to medicine for further management.  64-year-old female with PMHx of HTN, HLD, Glaucoma, presents today for altered mental status. Per family patient was found in urine on bathroom floor, last known well was 10 PM prior to going to bed. Patient A&O x 3 at baseline. Today, pt is A&O x2, answer questions slowly and follow commands. Complaining of headaches and blurry vision. Denies fever, chills, chest pain, SOB, abdominal pain, N/V/D.    ED Vitals:  T(C): 36.7 (01-09-25 @ 07:30), Max: 37 (01-09-25 @ 06:14)  HR: 92 (01-09-25 @ 07:30) (92 - 100)  BP: 140/80 (01-09-25 @ 07:30) (140/80 - 143/88)  RR: 20 (01-09-25 @ 07:30) (18 - 20)  SpO2: 100% (01-09-25 @ 07:30) (100% - 100%)    Labs:  WBC 10.92, Hgb 14.6, MVC 77.9  Na 138, K 3.9  Glu 159  Trop T 51    UA Negative    Imaging:  CT PERFUSION: Artifactual perfusion abnormality in the right frontal region, otherwise   no perfusion deficits to suggest completed infarction or at risk territory.    CTA HEAD/NECK: No large vessel occlusion, aneurysm, or vascular malformation.  Focal mild stenosis of the right A2 JORGE A.  Focal high-grade stenosis in the left P2 PCA.    CXR: No radiographic evidence of acute cardiopulmonary disease.      S/p neurology eval.  Out of the window for IV thrombolytics. NIHSS 1 for non disabling signs not a candidate for IA intervention.      Admitted to medicine for further management.  64-year-old female with PMHx of HTN, HLD, Glaucoma, presents today for altered mental status. Per family patient was found in urine on bathroom floor, last known well was 10 PM prior to going to bed. Patient A&O x 3 at baseline. Today, pt is A&O x2, answer questions slowly and follow commands. Pt complain of headaches and blurry vision. Denies fever, chills, chest pain, dizziness, SOB, abdominal pain, N/V/D.    ED Vitals:  T(C): 36.7 (01-09-25 @ 07:30), Max: 37 (01-09-25 @ 06:14)  HR: 92 (01-09-25 @ 07:30) (92 - 100)  BP: 140/80 (01-09-25 @ 07:30) (140/80 - 143/88)  RR: 20 (01-09-25 @ 07:30) (18 - 20)  SpO2: 100% (01-09-25 @ 07:30) (100% - 100%)    Labs:  WBC 10.92, Hgb 14.6, MVC 77.9  Na 138, K 3.9  Glu 159  Trop T 51    UA Negative    Imaging:  CT PERFUSION: Artifactual perfusion abnormality in the right frontal region, otherwise   no perfusion deficits to suggest completed infarction or at risk territory.    CTA HEAD/NECK: No large vessel occlusion, aneurysm, or vascular malformation.  Focal mild stenosis of the right A2 JORGE A.  Focal high-grade stenosis in the left P2 PCA.    CXR: No radiographic evidence of acute cardiopulmonary disease.      S/p neurology eval.  Out of the window for IV thrombolytics. NIHSS 1 for non disabling signs not a candidate for IA intervention.      Admitted to medicine for further management.

## 2025-01-09 NOTE — H&P ADULT - ATTENDING COMMENTS
64-year-old female  with history of HTN, DM2 and HLD was brought to ED by EMS for  altered mental status.  Family said last known well was 10 PM last night and family found her on the floor covered in urine this morning at 5 AM. Patient was confused and slow with answering questions, patient doesn't recall what happens or why she is in the hospital, only reports headache, she is diabetic, denies alcohol or drugs use, in the ED stroke code called, NIHSS 1, CT head was negative.     Physical exam:   General: Awake, confused,   HEENT: PERRA  neck: Supple  Chest: Clear  Heart: RRR S1 S2  Abdomen: soft, nontender  Ext: no edema, pulses are ok  neurology: CN II-XII intact, strength 5/5 in UE, 4/5 in LE Left is weaker than right possibly due to pain.     A/P:   Altered Mental Status:   Possibly toxic metabolic encephalopathy vs Seizure:   Neuro exam showed CN II-XII intact, strength 5/5 in UE, 4/5 in LE Left is weaker than right possibly due to pain  Head CT and CT angio showed no acute CVA or bleeding.   UA negative, CXR is clear. Labs are ok  Check urine tox, CK level  Seen by sadi betancourt EEG.    HTN: Continue home med  elevated Glucose: check A1c

## 2025-01-09 NOTE — STROKE CODE NOTE - NSSTROKETPAEXCLABS_TIMEWINDOW
She just had a right periurethral laceration that was reapproximated.  The suture will dissolve on its own.  Thanks!   Patient is outside the appropriate time window for thrombolytic therapy

## 2025-01-09 NOTE — ED PROVIDER NOTE - PHYSICAL EXAMINATION
CONSTITUTIONAL: Well-developed; well-nourished; in no acute distress  HEAD: Normocephalic; atraumatic  EYES: PERRL, EOM intact; conjunctiva and sclera clear  ENT: No nasal discharge; airway clear. MMM  NECK: Supple; non tender. No anterior cervical lymphadenopathy noted  CARD: S1, S2 normal; no murmurs, gallops, or rubs. Regular rate and rhythm  RESP: CTAB/L, no wheezes, rales or rhonchi  ABD: Normal bowel sounds; soft; non-distended; non-tender; no CVA tenderness  EXT: Normal ROM. No calf tenderness or edema. Distal pulses intact  NEURO: Alert, oriented. Grossly unremarkable. No focal deficits, Patient is slow to response, no dysarthria, no facial asymmetry, no apparent drift  SKIN: Skin exam is warm and dry, no acute rash  MS: No midline spinal tenderness.

## 2025-01-09 NOTE — H&P ADULT - NSHPPHYSICALEXAM_GEN_ALL_CORE
GENERAL: NAD  HEAD:  Atraumatic, normocephalic  EYES: EOMI, PERRL  NECK: Supple, trachea midline, no JVD  HEART: Regular rate and rhythm  LUNGS: Clear to auscultation bilaterally, no crackles, wheezing, or rhonchi  ABDOMEN: Soft, nontender, nondistended, +BS  EXTREMITIES: 2+ peripheral pulses bilaterally. No clubbing, cyanosis, or edema  NEURO: Alert, oriented. Grossly unremarkable. No focal deficits, no dysarthria, no facial asymmetry GENERAL: NAD  HEAD:  Atraumatic, normocephalic  EYES: EOMI, PERRL  NECK: Supple, trachea midline, no JVD  HEART: Regular rate and rhythm  LUNGS: Clear to auscultation bilaterally, no crackles, wheezing, or rhonchi  ABDOMEN: Soft, nontender, nondistended, +BS  EXTREMITIES: 2+ peripheral pulses bilaterally. No clubbing, cyanosis, or edema  NEURO: A&O x 2. No focal deficits, no dysarthria, no facial asymmetry

## 2025-01-09 NOTE — ED ADULT TRIAGE NOTE - CHIEF COMPLAINT QUOTE
pt biba for ams. pt last seen well was at 10 pm last night. this morning she was found on the bathroom floor and had urinated on herself. pt is alert but confused at this time. FS is 178

## 2025-01-09 NOTE — ED PROVIDER NOTE - PROGRESS NOTE DETAILS
Pt s/o to  Dr. Mccain to follow up labs, imaging, neuro consult, reassess and dispo. AE: CT scan significant for stenosis in the PCA and JORGE A.  Discussed with neurology who does not believe symptoms are related to CT findings; patient is not having an acute stroke.  Upon reassessment, patient is alert and oriented to person and place, however not time.  Will admit to medicine for persistent altered mental status.

## 2025-01-09 NOTE — H&P ADULT - NSHPREVIEWOFSYSTEMS_GEN_ALL_CORE

## 2025-01-09 NOTE — CONSULT NOTE ADULT - ASSESSMENT
Impression:  64-year-old female past medical history hypertension, hyperlipidemia presents today for altered mental status.  Family said last known well was 10 PM last night and family found her on the floor covered in urine this morning at 5 AM.  Patient normally A+O x 3, today answering questions more slowly. Essentially returned to baseline in ED. Prior syncope with COVID. Out of the window for IV thrombolytics. NIHSS 1 for non disabling signs not a candidate for IA intervention. Etiology of symptoms unknown may be due to toxic metabolic cause, likely not neurovascular in nature.    Suggestion:  -Follow up CTA  -MRI brain without armando  -Routine EEG  -CXR, UA  -Avoid dehydration, hypotension, hypotension  -Telemetry monitoring  -Keep magnesium >2  -Seizure precautions Impression:  64-year-old female past medical history hypertension, hyperlipidemia presents today for altered mental status.  Family said last known well was 10 PM last night and family found her on the floor covered in urine this morning at 5 AM.  Patient normally A+O x 3, today answering questions more slowly. Essentially returned to baseline in ED. Prior syncope with COVID. Out of the window for IV thrombolytics. NIHSS 1 for non disabling signs not a candidate for IA intervention. Etiology of symptoms unknown may be due to toxic metabolic cause, likely not neurovascular in nature.    Suggestion:  -Follow up CTA  -MRI brain without armando  -Routine EEG  -CXR, UA  -Avoid dehydration, hypotension, hypotension  -Telemetry monitoring  -Keep magnesium >2  -Seizure precautions  -If not on antiplatelet or anticoagulation can be on ASA 81 mg daily.

## 2025-01-09 NOTE — CONSULT NOTE ADULT - SUBJECTIVE AND OBJECTIVE BOX
Neurology Consult    Patient is a 64y old  Female who presents with a chief complaint of altered mental status    HPI:  64-year-old female past medical history hypertension, hyperlipidemia presents today for altered mental status.  Family said last known well was 10 PM last night and family found her on the floor covered in urine this morning at 5 AM.  Patient normally A+O x 3, today answering questions more slowly. Essentially returned to baseline in ED. Prior syncope with COVID.     PAST MEDICAL & SURGICAL HISTORY:  Hypertension, unspecified type      HLD (hyperlipidemia)      Mood disorder          FAMILY HISTORY:      Social History: (-) x 3    Allergies    No Known Allergies    Intolerances        MEDICATIONS  (STANDING):    MEDICATIONS  (PRN):    Vital Signs Last 24 Hrs  T(C): 37 (09 Jan 2025 06:14), Max: 37 (09 Jan 2025 06:14)  T(F): 98.6 (09 Jan 2025 06:14), Max: 98.6 (09 Jan 2025 06:14)  HR: 100 (09 Jan 2025 06:14) (100 - 100)  BP: 143/88 (09 Jan 2025 06:14) (143/88 - 143/88)  BP(mean): --  RR: 18 (09 Jan 2025 06:14) (18 - 18)  SpO2: 100% (09 Jan 2025 06:14) (100% - 100%)        Examination:  Cognitive/Language:  The patient is oriented to person, place, age and month .  Recent and remote memory intact.  Fund of knowledge is intact and normal.  Language with normal repetition, comprehension and naming.  Nondysarthric.    Eyes: intact VA, VFF.  EOMI w/o nystagmus, skew or reported double vision.  PERRL.  No ptosis/weakness of eyelid closure.    Face:  Facial sensation normal V1 - 3, no facial asymmetry.    Formal Muscle Strength Testing: (MRC grade R/L) 5/5 UE; 5/5 LE.    Sensory examination:   Intact to light touch in all extremities.  Cerebellum:   FTN/HKS intact with normal WES in all limbs.  No dysmetria or dysdiadokinesia.     NIHSS 1  m-RS 0                  Neuroimaging:  < from: CT Brain Stroke Protocol (01.09.25 @ 06:31) >  IMPRESSION:  1.  Loss of gray-white matter differentiation in the high right frontal   lobe may reflect an acute ischemic infarct.  2.  Age indeterminant, likely chronic lacunar infarct in the right   thalamus is new from prior.  3.  Mild chronic microvascular ischemic changes.      < end of copied text >

## 2025-01-09 NOTE — ED PROVIDER NOTE - OBJECTIVE STATEMENT
64-year-old female past medical history hypertension hyperlipidemia presents today for altered mental status.  Family said last known well was 10 PM last night and family found her on the floor covered in urine this morning at 5 AM.  Patient normally ANO x 3, today answering questions more slowly.  Patient able to follow commands.  Patient unsure of situation regarding her presentation.

## 2025-01-09 NOTE — CONSULT NOTE ADULT - NS ATTEND AMEND GEN_ALL_CORE FT
64-year-old woman with hypertension, hyperlipidemia, prior syncope presented after family found her on the floor covered in urine this morning. Patient without recollection of events, feels back to her usual self apart being tired/drowsy. No prior known seizures. Exam: not oriented to year(2024), following all simple commands, no language disturbances, decreased to pinprick in LUE (though did not endorse numbness herself). CT Head with vague hypodensity in R frontal lobe - chronic stroke? CTA H/N with mild R A2 stenosis and severe L P2 stenosis. Clinical suspicion for stroke low given patient back to baseline and with only subjective LUE numbness, seizure vs TME also possible. Recommendations as above.

## 2025-01-09 NOTE — STROKE CODE NOTE - ACTIVATED STROKE TEAM
Pt appeared to sleep through shift, approximately 8 hours. No safety concerns noted or reported. Pt remains on status 15 minute checks. Pt is on SI, SIB, sexual, and elopement precautions.     Yes

## 2025-01-09 NOTE — ED PROVIDER NOTE - CLINICAL SUMMARY MEDICAL DECISION MAKING FREE TEXT BOX
.    Patient with confusion, last known well 10 PM last night.  Stroke code called.    On reassessment after signout patient is awake, alert,NIHSS = 1 decreased sensation on left face arm and leg.  Still has some mild confusion.    CT imaging shows no acute stroke, stenosis to right JORGE A and left PCA as noted, otherwise no stenosis or obstruction.    EKG normal sinus rhythm, rate 96, no STEMI.    No acute events.  Patient eval by neurology.  Patient outside window for thrombolytics.  Patient not candidate for endovascular interventions.  Their service did not feel that it etiology is neurovascular in nature.  Recommend MRI.    IMP: Patient admitted to med/tele for further workup and management.      .
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
## 2025-01-09 NOTE — ED PROVIDER NOTE - ATTENDING CONTRIBUTION TO CARE
64-year-old female with PMH HTN, HLD brought in by EMS for altered mental status.  Per family patient was found in urine on bathroom floor, last known well was 10 PM when they saw her prior to going to bed.  Patient AAO x 1-2.  Reports she has a dull headache.  No apparent focal deficits.  Patient denies pain anywhere else.  No chest pain or shortness of breath. Pt answering questions slowly.    VITAL SIGNS: noted  CONSTITUTIONAL: Well-developed; well-nourished; in no acute distress  HEAD: Normocephalic; atraumatic  EYES: PERRL, EOM intact; conjunctiva and sclera clear  ENT: No nasal discharge; airway clear. MMM  NECK: Supple; non tender. No anterior cervical lymphadenopathy noted  CARD: S1, S2 normal; no murmurs, gallops, or rubs. Regular rate and rhythm  RESP: CTAB/L, no wheezes, rales or rhonchi  ABD: Normal bowel sounds; soft; non-distended; non-tender; no CVA tenderness  EXT: Normal ROM. No calf tenderness or edema. Distal pulses intact  NEURO: Alert, oriented. Grossly unremarkable. No focal deficits, Patient is slow to response, no dysarthria, no facial asymmetry, no apparent drift  SKIN: Skin exam is warm and dry, no acute rash  MS: No midline spinal tenderness

## 2025-01-09 NOTE — CONSULT NOTE ADULT - TIME BILLING
Review of imaging and chart; obtaining history; examination of patient; discussion and coordination of care.

## 2025-01-09 NOTE — H&P ADULT - NSHPLABSRESULTS_GEN_ALL_CORE
LABS:                         14.6   10.92 )-----------( 196      ( 2025 06:30 )             41.3         138  |  99  |  20  ----------------------------<  159[H]  3.9   |  27  |  1.2    Ca    9.3      2025 06:30    TPro  6.9  /  Alb  4.1  /  TBili  0.6  /  DBili  x   /  AST  16  /  ALT  10  /  AlkPhos  78      PT/INR - ( 2025 06:30 )   PT: 11.80 sec;   INR: 1.00 ratio         PTT - ( 2025 06:30 )  PTT:28.2 sec  Urinalysis Basic - ( 2025 07:50 )    Color: Yellow / Appearance: Clear / S.025 / pH: x  Gluc: x / Ketone: Negative mg/dL  / Bili: Negative / Urobili: 1.0 mg/dL   Blood: x / Protein: Negative mg/dL / Nitrite: Negative   Leuk Esterase: Negative / RBC: x / WBC x   Sq Epi: x / Non Sq Epi: x / Bacteria: x          RADIOLOGY, EKG & ADDITIONAL TESTS: Reviewed.     CT Angio Neck Stroke Protocol w/ IV Cont (25 @ 06:49)  CT Angio Brain Stroke Protocol  w/ IV Cont (25 @ 06:49)   CT Brain Perfusion Maps Stroke (25 @ 06:37)    CT PERFUSION:  Artifactual perfusion abnormality in the right frontal region, otherwise   no perfusion deficits to suggest completed infarction or at risk   territory.    CTA HEAD/NECK:  No large vessel occlusion, aneurysm, or vascular malformation.    Focal mild stenosis of the right A2 JORGE A.    Focal high-grade stenosis in the left P2 PCA.        CT Brain Stroke Protocol (25 @ 06:31)    IMPRESSION:  1.  Indeterminate hypodensity in the high right frontal lobe. See CT   perfusion study.  2.  No acute intracranial hemorrhage.        Xray Chest 1 View- PORTABLE-Urgent (Xray Chest 1 View- PORTABLE-Urgent .) (25 @ 09:18)    No radiographic evidence of acute cardiopulmonary disease.

## 2025-01-10 LAB
A1C WITH ESTIMATED AVERAGE GLUCOSE RESULT: 6.1 % — HIGH (ref 4–5.6)
ALBUMIN SERPL ELPH-MCNC: 3.6 G/DL — SIGNIFICANT CHANGE UP (ref 3.5–5.2)
ALP SERPL-CCNC: 73 U/L — SIGNIFICANT CHANGE UP (ref 30–115)
ALT FLD-CCNC: 9 U/L — SIGNIFICANT CHANGE UP (ref 0–41)
ANION GAP SERPL CALC-SCNC: 12 MMOL/L — SIGNIFICANT CHANGE UP (ref 7–14)
AST SERPL-CCNC: 13 U/L — SIGNIFICANT CHANGE UP (ref 0–41)
BASOPHILS # BLD AUTO: 0.02 K/UL — SIGNIFICANT CHANGE UP (ref 0–0.2)
BASOPHILS NFR BLD AUTO: 0.2 % — SIGNIFICANT CHANGE UP (ref 0–1)
BILIRUB SERPL-MCNC: 0.4 MG/DL — SIGNIFICANT CHANGE UP (ref 0.2–1.2)
BUN SERPL-MCNC: 34 MG/DL — HIGH (ref 10–20)
CALCIUM SERPL-MCNC: 8.7 MG/DL — SIGNIFICANT CHANGE UP (ref 8.4–10.4)
CHLORIDE SERPL-SCNC: 99 MMOL/L — SIGNIFICANT CHANGE UP (ref 98–110)
CHOLEST SERPL-MCNC: 217 MG/DL — HIGH
CO2 SERPL-SCNC: 26 MMOL/L — SIGNIFICANT CHANGE UP (ref 17–32)
CREAT SERPL-MCNC: 1.7 MG/DL — HIGH (ref 0.7–1.5)
EGFR: 34 ML/MIN/1.73M2 — LOW
EOSINOPHIL # BLD AUTO: 0.09 K/UL — SIGNIFICANT CHANGE UP (ref 0–0.7)
EOSINOPHIL NFR BLD AUTO: 1 % — SIGNIFICANT CHANGE UP (ref 0–8)
ESTIMATED AVERAGE GLUCOSE: 128 MG/DL — HIGH (ref 68–114)
FOLATE SERPL-MCNC: 8.2 NG/ML — SIGNIFICANT CHANGE UP
GLUCOSE SERPL-MCNC: 134 MG/DL — HIGH (ref 70–99)
HCT VFR BLD CALC: 39.6 % — SIGNIFICANT CHANGE UP (ref 37–47)
HDLC SERPL-MCNC: 57 MG/DL — SIGNIFICANT CHANGE UP
HGB BLD-MCNC: 14.1 G/DL — SIGNIFICANT CHANGE UP (ref 12–16)
IMM GRANULOCYTES NFR BLD AUTO: 0.2 % — SIGNIFICANT CHANGE UP (ref 0.1–0.3)
LACTATE SERPL-SCNC: 1.9 MMOL/L — SIGNIFICANT CHANGE UP (ref 0.7–2)
LIPID PNL WITH DIRECT LDL SERPL: 152 MG/DL — HIGH
LYMPHOCYTES # BLD AUTO: 1.69 K/UL — SIGNIFICANT CHANGE UP (ref 1.2–3.4)
LYMPHOCYTES # BLD AUTO: 19.3 % — LOW (ref 20.5–51.1)
MAGNESIUM SERPL-MCNC: 2.2 MG/DL — SIGNIFICANT CHANGE UP (ref 1.8–2.4)
MCHC RBC-ENTMCNC: 27.4 PG — SIGNIFICANT CHANGE UP (ref 27–31)
MCHC RBC-ENTMCNC: 35.6 G/DL — SIGNIFICANT CHANGE UP (ref 32–37)
MCV RBC AUTO: 77 FL — LOW (ref 81–99)
MONOCYTES # BLD AUTO: 0.7 K/UL — HIGH (ref 0.1–0.6)
MONOCYTES NFR BLD AUTO: 8 % — SIGNIFICANT CHANGE UP (ref 1.7–9.3)
NEUTROPHILS # BLD AUTO: 6.23 K/UL — SIGNIFICANT CHANGE UP (ref 1.4–6.5)
NEUTROPHILS NFR BLD AUTO: 71.3 % — SIGNIFICANT CHANGE UP (ref 42.2–75.2)
NON HDL CHOLESTEROL: 160 MG/DL — HIGH
NRBC # BLD: 0 /100 WBCS — SIGNIFICANT CHANGE UP (ref 0–0)
PLATELET # BLD AUTO: 188 K/UL — SIGNIFICANT CHANGE UP (ref 130–400)
PMV BLD: 12.5 FL — HIGH (ref 7.4–10.4)
POTASSIUM SERPL-MCNC: 3.8 MMOL/L — SIGNIFICANT CHANGE UP (ref 3.5–5)
POTASSIUM SERPL-SCNC: 3.8 MMOL/L — SIGNIFICANT CHANGE UP (ref 3.5–5)
PROT SERPL-MCNC: 6.4 G/DL — SIGNIFICANT CHANGE UP (ref 6–8)
RBC # BLD: 5.14 M/UL — SIGNIFICANT CHANGE UP (ref 4.2–5.4)
RBC # FLD: 14.2 % — SIGNIFICANT CHANGE UP (ref 11.5–14.5)
SODIUM SERPL-SCNC: 137 MMOL/L — SIGNIFICANT CHANGE UP (ref 135–146)
TRIGL SERPL-MCNC: 47 MG/DL — SIGNIFICANT CHANGE UP
TSH SERPL-MCNC: 0.81 UIU/ML — SIGNIFICANT CHANGE UP (ref 0.27–4.2)
VIT B12 SERPL-MCNC: 481 PG/ML — SIGNIFICANT CHANGE UP (ref 232–1245)
WBC # BLD: 8.75 K/UL — SIGNIFICANT CHANGE UP (ref 4.8–10.8)
WBC # FLD AUTO: 8.75 K/UL — SIGNIFICANT CHANGE UP (ref 4.8–10.8)

## 2025-01-10 PROCEDURE — 99233 SBSQ HOSP IP/OBS HIGH 50: CPT

## 2025-01-10 PROCEDURE — 93010 ELECTROCARDIOGRAM REPORT: CPT

## 2025-01-10 PROCEDURE — 74018 RADEX ABDOMEN 1 VIEW: CPT | Mod: 26

## 2025-01-10 PROCEDURE — 95819 EEG AWAKE AND ASLEEP: CPT | Mod: 26

## 2025-01-10 PROCEDURE — 70551 MRI BRAIN STEM W/O DYE: CPT | Mod: 26

## 2025-01-10 RX ORDER — PREDNISONE 5 MG
40 TABLET ORAL DAILY
Refills: 0 | Status: DISCONTINUED | OUTPATIENT
Start: 2025-01-11 | End: 2025-01-13

## 2025-01-10 RX ORDER — ALBUTEROL SULFATE 90 UG/1
2.5 INHALANT RESPIRATORY (INHALATION) EVERY 6 HOURS
Refills: 0 | Status: DISCONTINUED | OUTPATIENT
Start: 2025-01-10 | End: 2025-01-13

## 2025-01-10 RX ORDER — METHYLPREDNISOLONE 4 MG/1
40 TABLET ORAL ONCE
Refills: 0 | Status: COMPLETED | OUTPATIENT
Start: 2025-01-10 | End: 2025-01-10

## 2025-01-10 RX ORDER — LORAZEPAM 1 MG/1
1 TABLET ORAL ONCE
Refills: 0 | Status: DISCONTINUED | OUTPATIENT
Start: 2025-01-10 | End: 2025-01-10

## 2025-01-10 RX ORDER — FLUTICASONE PROPIONATE AND SALMETEROL 50; 500 UG/1; UG/1
1 POWDER ORAL; RESPIRATORY (INHALATION)
Refills: 0 | Status: DISCONTINUED | OUTPATIENT
Start: 2025-01-10 | End: 2025-01-13

## 2025-01-10 RX ORDER — ATORVASTATIN CALCIUM 40 MG/1
80 TABLET, FILM COATED ORAL AT BEDTIME
Refills: 0 | Status: DISCONTINUED | OUTPATIENT
Start: 2025-01-10 | End: 2025-01-13

## 2025-01-10 RX ORDER — LIDOCAINE 50 MG/G
1 OINTMENT TOPICAL ONCE
Refills: 0 | Status: COMPLETED | OUTPATIENT
Start: 2025-01-10 | End: 2025-01-10

## 2025-01-10 RX ORDER — SODIUM CHLORIDE 9 MG/ML
1000 INJECTION, SOLUTION INTRAVENOUS ONCE
Refills: 0 | Status: COMPLETED | OUTPATIENT
Start: 2025-01-10 | End: 2025-01-10

## 2025-01-10 RX ADMIN — ACETAMINOPHEN 650 MILLIGRAM(S): 80 SOLUTION/ DROPS ORAL at 02:03

## 2025-01-10 RX ADMIN — SODIUM CHLORIDE 1000 MILLILITER(S): 9 INJECTION, SOLUTION INTRAVENOUS at 12:19

## 2025-01-10 RX ADMIN — ACETAMINOPHEN 650 MILLIGRAM(S): 80 SOLUTION/ DROPS ORAL at 22:01

## 2025-01-10 RX ADMIN — ATORVASTATIN CALCIUM 80 MILLIGRAM(S): 40 TABLET, FILM COATED ORAL at 21:36

## 2025-01-10 RX ADMIN — LORAZEPAM 1 MILLIGRAM(S): 1 TABLET ORAL at 11:43

## 2025-01-10 RX ADMIN — ACETAMINOPHEN 650 MILLIGRAM(S): 80 SOLUTION/ DROPS ORAL at 13:01

## 2025-01-10 RX ADMIN — NIFEDIPINE 90 MILLIGRAM(S): 60 TABLET, EXTENDED RELEASE ORAL at 06:33

## 2025-01-10 RX ADMIN — ALBUTEROL SULFATE 2.5 MILLIGRAM(S): 90 INHALANT RESPIRATORY (INHALATION) at 14:06

## 2025-01-10 RX ADMIN — Medication 81 MILLIGRAM(S): at 11:44

## 2025-01-10 RX ADMIN — LATANOPROST 1 DROP(S): 50 SOLUTION OPHTHALMIC at 11:44

## 2025-01-10 RX ADMIN — METHYLPREDNISOLONE 40 MILLIGRAM(S): 4 TABLET ORAL at 11:44

## 2025-01-10 RX ADMIN — LIDOCAINE 1 PATCH: 50 OINTMENT TOPICAL at 20:30

## 2025-01-10 RX ADMIN — Medication 1 DROP(S): at 13:02

## 2025-01-10 RX ADMIN — ACETAMINOPHEN 650 MILLIGRAM(S): 80 SOLUTION/ DROPS ORAL at 02:23

## 2025-01-10 RX ADMIN — PANTOPRAZOLE 40 MILLIGRAM(S): 40 TABLET, DELAYED RELEASE ORAL at 06:33

## 2025-01-10 RX ADMIN — LOSARTAN POTASSIUM 100 MILLIGRAM(S): 100 TABLET, FILM COATED ORAL at 06:33

## 2025-01-10 RX ADMIN — ACETAMINOPHEN 650 MILLIGRAM(S): 80 SOLUTION/ DROPS ORAL at 16:47

## 2025-01-10 RX ADMIN — ACETAMINOPHEN 650 MILLIGRAM(S): 80 SOLUTION/ DROPS ORAL at 00:39

## 2025-01-10 NOTE — CHART NOTE - NSCHARTNOTEFT_GEN_A_CORE
Case reviewed with attending Dr. Amos. MRI brain showing equivocal punctate acute infarcts in the left corona radiata and left parietal white matter. REEG negative. Patient can continue aspirin 81mg and atorvastatin 80mg. Follow up stroke clinic outpatient. Please call [phone] with any questions. Case reviewed with attending Dr. Amos. MRI brain showing equivocal punctate acute infarcts in the left corona radiata and left parietal white matter (which would not correlate with subjective LUE numbness), along with chronic lacunar strokes in R thalamus and L basal ganglia. REEG negative. Patient can continue aspirin 81mg and atorvastatin 80mg. No further inpatient workup from neuro standpoint. Follow up stroke clinic outpatient. Please call [phone] with any questions.

## 2025-01-10 NOTE — PROGRESS NOTE ADULT - SUBJECTIVE AND OBJECTIVE BOX
SUBJECTIVE/OVERNIGHT EVENTS  Today is hospital day 1d.    MEDICATIONS  STANDING MEDICATIONS  albuterol    0.083% 2.5 milliGRAM(s) Nebulizer every 6 hours  aspirin  chewable 81 milliGRAM(s) Oral daily  atorvastatin 40 milliGRAM(s) Oral at bedtime  dorzolamide 2% Ophthalmic Solution 1 Drop(s) Both EYES <User Schedule>  fluticasone propionate/ salmeterol 250-50 MICROgram(s) Diskus 1 Dose(s) Inhalation two times a day  hydrochlorothiazide 25 milliGRAM(s) Oral daily  latanoprost 0.005% Ophthalmic Solution 1 Drop(s) Both EYES daily  losartan 100 milliGRAM(s) Oral daily  NIFEdipine XL 90 milliGRAM(s) Oral daily  pantoprazole    Tablet 40 milliGRAM(s) Oral before breakfast  timolol 0.5% Solution 1 Drop(s) Both EYES <User Schedule>    PRN MEDICATIONS  acetaminophen     Tablet .. 650 milliGRAM(s) Oral every 6 hours PRN    VITALS  T(F): 98.9 (01-10-25 @ 05:01), Max: 101.4 (01-10-25 @ 00:41)  HR: 91 (01-10-25 @ 05:01) (91 - 113)  BP: 117/77 (01-10-25 @ 05:01) (117/77 - 146/89)  RR: 18 (01-10-25 @ 05:01) (18 - 20)  SpO2: 94% (01-10-25 @ 00:58) (94% - 98%)    PHYSICAL EXAM  GENERAL  ( x ) NAD, obese female sitting in chair    HEAD  ( x ) Atraumatic     (  ) hematoma     (  ) laceration (specify location:       )     HEART  Rate -->  (x  ) normal rate    (  ) bradycardic    (  ) tachycardic  Rhythm -->  (  ) regular    (  ) regularly irregular    (  ) irregularly irregular  Murmurs -->  (  ) normal s1/s2    (  ) systolic murmur    (  ) diastolic murmur    (  ) continuous murmur     (  ) S3 present    (  ) S4 present    LUNGS  ( x )Unlabored respirations     (  ) tachypnea  Decreased air entry bilaterally    ABDOMEN  Soft, tender abdomen.    NERVOUS SYSTEM  (x  ) A&Ox3     (  ) confused     (  ) lethargic  CN II-XII:     (  ) Intact     (  ) focal deficits  (Specify:     )   Upper extremities:     (  ) strength X/5     (  ) focal deficit (specify:    )  Lower extremities:     (  ) strength  X/5    (  ) focal deficit (specify:    )      LABS             14.1   8.75  )-----------( 188      ( 01-10-25 @ 04:34 )             39.6     137  |  99  |  34  -------------------------<  134   01-10-25 @ 04:34  3.8  |  26  |  1.7    Ca      8.7     01-10-25 @ 04:34  Mg     2.2     01-10-25 @ 04:34    TPro  6.4  /  Alb  3.6  /  TBili  0.4  /  DBili  x   /  AST  13  /  ALT  9   /  AlkPhos  73  /  GGT  x     01-10-25 @ 04:34    PT/INR - ( 01-09-25 @ 06:30 )   PT: 11.80 sec;   INR: 1.00 ratio  PTT - ( 01-09-25 @ 06:30 )  PTT:28.2 sec    Troponin T, High Sensitivity Result: 7 ng/L (01-09-25 @ 06:30)    Urinalysis Basic - ( 10 Daniel 2025 04:34 )    Color: x / Appearance: x / SG: x / pH: x  Gluc: 134 mg/dL / Ketone: x  / Bili: x / Urobili: x   Blood: x / Protein: x / Nitrite: x   Leuk Esterase: x / RBC: x / WBC x   Sq Epi: x / Non Sq Epi: x / Bacteria: x          Urinalysis with Rflx Culture (collected 09 Jan 2025 07:50)

## 2025-01-10 NOTE — PROGRESS NOTE ADULT - ASSESSMENT
A 64-year-old female with a medical history of hypertension, hyperlipidemia, and glaucoma presented with altered mental status. She was found by her family on the bathroom floor, incontinent of urine, and was last known to be well the previous evening at 10 PM. Typically, she is alert and oriented to person, place, and time. In the emergency department, she was slow to answer questions and had mild confusion but essentially returned to her baseline mental status. A stroke code was initiated, but she was outside the window for thrombolytics and had a low NIHSS score, indicating non-disabling signs. Her confusion, headache, and blurred vision raised concerns for possible transient ischemic attack, stroke, seizure, hypertensive encephalopathy, or a metabolic/infectious cause.    **Assessment and Plan:**    # Altered Mental Status  - Stroke workup: CT head and CTA head/neck showed no acute infarct; chronic changes noted.  - Routine EEG and MRI brain without gadolinium recommended to evaluate for seizure activity and structural abnormalities.  MRI: IMPRESSION:  Equivocal punctate acute infarcts in the left corona radiata and left   parietal white matter. No intracranial hemorrhage or midline shift.  Chronic lacunar infarcts in the right thalamus and left basal ganglia,   new since the prior MR brain from 4/14/2010. New remote hypertensive   microhemorrhage in the right thalamus. Interval development of numerous   microcystic perivascular spaces in bilateral basal ganglia which may be   sequelae of chronic hypertension.  Incidental empty sella within the enlarged sella turcica which can be   seen with idiopathic intracranial hypertension.  EEG: No electrographic seizures  - Ensure seizure precautions and keep magnesium levels >2.  - Monitor telemetry, as telemetry monitoring is critical to detect any arrhythmias.  - Evaluate for metabolic and infectious causes with UA, CXR, Flu/RSV/COVID tests, and labs for B12, folate, syphilis, and TSH.  - RSV Positive  - 1/10/25: 1 L LR bolus. Obtain 16:00 lactate.    # Poor air movement, wheeze in setting of positive RSV  - Prednisone 40 daily  - Start albuterol nebulization  - Start ADVAIR    #Abdominal pain  - Obtain KUB    # Hypertension  - Nifedipine, Losartan-HCT for now.    # Hyperlipidemia  - Atorvastatin 40 mg daily, as she is not currently on lipid-lowering therapy.    # Prediabetes: A1c: 6.1  - Monitor glucose    # Glaucoma  - Continue home eye drops: Latanoprost and Dorzolamide.    To follow-up: 16:00 lactate, KUB     A 64-year-old female with a medical history of hypertension, hyperlipidemia, and glaucoma presented with altered mental status. She was found by her family on the bathroom floor, incontinent of urine, and was last known to be well the previous evening at 10 PM. Typically, she is alert and oriented to person, place, and time. In the emergency department, she was slow to answer questions and had mild confusion but essentially returned to her baseline mental status. A stroke code was initiated, but she was outside the window for thrombolytics and had a low NIHSS score, indicating non-disabling signs. Her confusion, headache, and blurred vision raised concerns for possible transient ischemic attack, stroke, seizure, hypertensive encephalopathy, or a metabolic/infectious cause.    **Assessment and Plan:**    # Altered Mental Status  - Stroke workup: CT head and CTA head/neck showed no acute infarct; chronic changes noted.  - Routine EEG and MRI brain without gadolinium recommended to evaluate for seizure activity and structural abnormalities.  MRI: IMPRESSION:  Equivocal punctate acute infarcts in the left corona radiata and left   parietal white matter. No intracranial hemorrhage or midline shift.  Chronic lacunar infarcts in the right thalamus and left basal ganglia,   new since the prior MR brain from 4/14/2010. New remote hypertensive   microhemorrhage in the right thalamus. Interval development of numerous   microcystic perivascular spaces in bilateral basal ganglia which may be   sequelae of chronic hypertension.  Incidental empty sella within the enlarged sella turcica which can be   seen with idiopathic intracranial hypertension.  EEG: No electrographic seizures  - Stroke team re-called on 1/10/25 to notify them of the acute infarct and to request guidance on dual antiplatelet therapy. They will leave a note.  - Atorvastatin changed to high dose  - Ensure seizure precautions and keep magnesium levels >2.  - Monitor telemetry, as telemetry monitoring is critical to detect any arrhythmias.  - Evaluate for metabolic and infectious causes with UA, CXR, Flu/RSV/COVID tests, and labs for B12, folate, syphilis, and TSH.  - RSV Positive  - 1/10/25: 1 L LR bolus. Obtain 16:00 lactate.     # Poor air movement, wheeze in setting of positive RSV  - Prednisone 40 daily  - Start albuterol nebulization  - Start ADVAIR    #Abdominal pain  - Obtain KUB    # Hypertension  - Nifedipine, Losartan-HCT for now.    # Hyperlipidemia  - Atorvastatin 40 mg daily, as she is not currently on lipid-lowering therapy.    # Prediabetes: A1c: 6.1  - Monitor glucose    # Glaucoma  - Continue home eye drops: Latanoprost and Dorzolamide.    To follow-up: 16:00 lactate, KUB. F/marnie stroke team note for recs for acute stroke including regarding dual antiplatelet therapy.

## 2025-01-10 NOTE — PROGRESS NOTE ADULT - SUBJECTIVE AND OBJECTIVE BOX
W[place], BRAYDEN  64y Female    CHIEF COMPLAINT:    Patient is a 64y old  Female who presents with a chief complaint of Altered mental status (2025 12:52)      INTERVAL HPI/OVERNIGHT EVENTS:    Patient seen and examined.    ROS: All other systems are negative.    Vital Signs:    T(F): 98.9 (01-10-25 @ 05:01), Max: 101.4 (01-10-25 @ 00:41)  HR: 91 (01-10-25 @ 05:01) (91 - 113)  BP: 117/77 (01-10-25 @ 05:01) (117/77 - 146/89)  RR: 18 (01-10-25 @ 05:01) (18 - 20)  SpO2: 94% (01-10-25 @ 00:58) (94% - 100%)  I&O's Summary    Daily     Daily Weight in k.6 (10 Daniel 2025 00:58)  CAPILLARY BLOOD GLUCOSE          PHYSICAL EXAM:    GENERAL:  NAD  SKIN: No rashes or lesions  HENT: Atraumatic. Normocephalic. PERRL. Moist membranes.  NECK: Supple, No JVD. No lymphadenopathy.  PULMONARY: CTA B/L. No wheezing. No rales  CVS: Normal S1, S2. Rate and Rhythm are regular. No murmurs.  ABDOMEN/GI: Soft, Nontender, Nondistended; BS present  EXTREMITIES: Peripheral pulses intact. No edema B/L LE.  NEUROLOGIC:  No motor or sensory deficit.  PSYCH: Alert & oriented x 3    Consultant(s) Notes Reviewed:  [x ] YES  [ ] NO  Care Discussed with Consultants/Other Providers [ x] YES  [ ] NO    EKG reviewed  Telemetry reviewed    LABS:                        14.1   8.75  )-----------( 188      ( 10 Daniel 2025 04:34 )             39.6     01-10    137  |  99  |  34[H]  ----------------------------<  134[H]  3.8   |  26  |  1.7[H]    Ca    8.7      10 Daniel 2025 04:34  Mg     2.2     -10    TPro  6.4  /  Alb  3.6  /  TBili  0.4  /  DBili  x   /  AST  13  /  ALT  9   /  AlkPhos  73  -10    PT/INR - ( 2025 06:30 )   PT: 11.80 sec;   INR: 1.00 ratio         PTT - ( 2025 06:30 )  PTT:28.2 sec        Urinalysis with Rflx Culture (collected 2025 07:50)        RADIOLOGY & ADDITIONAL TESTS:    < from: CT Brain Perfusion Maps Stroke (25 @ 06:37) >  IMPRESSION:    CT PERFUSION:  Artifactual perfusion abnormality in the right frontal region, otherwise   no perfusion deficits to suggest completed infarction or at risk   territory.    CTA HEAD/NECK:  No large vessel occlusion, aneurysm, or vascular malformation.    Focal mild stenosis of the right A2 JORGE A.    Focal high-grade stenosis in the left P2 PCA.    < end of copied text >  < from: Xray Chest 1 View- PORTABLE-Urgent (Xray Chest 1 View- PORTABLE-Urgent .) (25 @ 09:18) >    Impression:    No radiographic evidence of acute cardiopulmonary disease.    < end of copied text >    Imaging or report Personally Reviewed:  [x ] YES  [ ] NO    Medications:  Standing  aspirin  chewable 81 milliGRAM(s) Oral daily  atorvastatin 40 milliGRAM(s) Oral at bedtime  dorzolamide 2% Ophthalmic Solution 1 Drop(s) Both EYES <User Schedule>  hydrochlorothiazide 25 milliGRAM(s) Oral daily  latanoprost 0.005% Ophthalmic Solution 1 Drop(s) Both EYES daily  losartan 100 milliGRAM(s) Oral daily  NIFEdipine XL 90 milliGRAM(s) Oral daily  pantoprazole    Tablet 40 milliGRAM(s) Oral before breakfast  timolol 0.5% Solution 1 Drop(s) Both EYES <User Schedule>    PRN Meds  acetaminophen     Tablet .. 650 milliGRAM(s) Oral every 6 hours PRN      Case discussed with resident    Care discussed with pt/family           BRAYDEN MARTINO  64y Female    CHIEF COMPLAINT:    Patient is a 64y old  Female who presents with a chief complaint of Altered mental status (2025 12:52)      INTERVAL HPI/OVERNIGHT EVENTS:    Patient seen and examined. C/O cough and sob. Spiking fever. Denies cp or palpitations. Still confused.     ROS: All other systems are negative.    Vital Signs:    T(F): 98.9 (01-10-25 @ 05:01), Max: 101.4 (01-10-25 @ 00:41)  HR: 91 (01-10-25 @ 05:01) (91 - 113)  BP: 117/77 (01-10-25 @ 05:01) (117/77 - 146/89)  RR: 18 (01-10-25 @ 05:01) (18 - 20)  SpO2: 94% (01-10-25 @ 00:58) (94% - 100%)  I&O's Summary    Daily     Daily Weight in k.6 (10 Daniel 2025 00:58)  CAPILLARY BLOOD GLUCOSE          PHYSICAL EXAM:    GENERAL:  NAD  SKIN: No rashes or lesions  HENT: Atraumatic. Normocephalic. PERRL. Moist membranes.  NECK: Supple, No JVD. No lymphadenopathy.  PULMONARY: CTA B/L. +ve wheezing. No rales  CVS: Normal S1, S2. Rate and Rhythm are regular. No murmurs.  ABDOMEN/GI: Soft, Nontender, Nondistended; BS present  EXTREMITIES: Peripheral pulses intact. No edema B/L LE.  NEUROLOGIC:  No motor or sensory deficit.  PSYCH: Alert & oriented x 2    Consultant(s) Notes Reviewed:  [x ] YES  [ ] NO  Care Discussed with Consultants/Other Providers [ x] YES  [ ] NO    EKG reviewed  Telemetry reviewed    LABS:                        14.1   8.75  )-----------( 188      ( 10 Daniel 2025 04:34 )             39.6     01-10    137  |  99  |  34[H]  ----------------------------<  134[H]   Creatinine Trend: 1.7<--, 1.2<--  3.8   |  26  |  1.7[H]    Ca    8.7      10 Daniel 2025 04:34  Mg     2.2     01-10    TPro  6.4  /  Alb  3.6  /  TBili  0.4  /  DBili  x   /  AST  13  /  ALT  9   /  AlkPhos  73  01-10    PT/INR - ( 2025 06:30 )   PT: 11.80 sec;   INR: 1.00 ratio         PTT - ( 2025 06:30 )  PTT:28.2 sec        Urinalysis with Rflx Culture (collected 2025 07:50)        RADIOLOGY & ADDITIONAL TESTS:    < from: CT Brain Perfusion Maps Stroke (25 @ 06:37) >  IMPRESSION:    CT PERFUSION:  Artifactual perfusion abnormality in the right frontal region, otherwise   no perfusion deficits to suggest completed infarction or at risk   territory.    CTA HEAD/NECK:  No large vessel occlusion, aneurysm, or vascular malformation.    Focal mild stenosis of the right A2 JORGE A.    Focal high-grade stenosis in the left P2 PCA.    < end of copied text >  < from: Xray Chest 1 View- PORTABLE-Urgent (Xray Chest 1 View- PORTABLE-Urgent .) (25 @ 09:18) >    Impression:    No radiographic evidence of acute cardiopulmonary disease.    < end of copied text >  Impression:  normal  Imaging or report Personally Reviewed:  [x ] YES  [ ] NO    Medications:  Standing  aspirin  chewable 81 milliGRAM(s) Oral daily  atorvastatin 40 milliGRAM(s) Oral at bedtime  dorzolamide 2% Ophthalmic Solution 1 Drop(s) Both EYES <User Schedule>  hydrochlorothiazide 25 milliGRAM(s) Oral daily  latanoprost 0.005% Ophthalmic Solution 1 Drop(s) Both EYES daily  losartan 100 milliGRAM(s) Oral daily  NIFEdipine XL 90 milliGRAM(s) Oral daily  pantoprazole    Tablet 40 milliGRAM(s) Oral before breakfast  timolol 0.5% Solution 1 Drop(s) Both EYES <User Schedule>    PRN Meds  acetaminophen     Tablet .. 650 milliGRAM(s) Oral every 6 hours PRN      Case discussed with resident    Care discussed with pt/family

## 2025-01-10 NOTE — CHART NOTE - NSCHARTNOTEFT_GEN_A_CORE
Pt complaining of pain in bilateral lower extremities. Assessed patient, sensation in tact, strength 5/5 bilaterally. Pt reports this pain occurs frequently and is not new. Ordered lidocaine patch.

## 2025-01-10 NOTE — PROGRESS NOTE ADULT - ASSESSMENT
64-year-old female with PMH of HTN, HLD, Glaucoma, presents today for altered mental status. Per family patient was found in urine on bathroom floor, last known well was 10 PM prior to going to bed. Patient A&O x 3 at baseline.      Toxic/Metabolic encephalopathy  RSV infection  HTN / DL  WALI              PLAN:    ·	Tele reviewed by me  ·	EKG on admission  ·	Stroke w/u reviewed. Negative  ·	RVP positive for RSV  ·	CXR: NAPD 64-year-old female with PMH of HTN, HLD, Glaucoma, presents today for altered mental status. Per family patient was found in urine on bathroom floor, last known well was 10 PM prior to going to bed. Patient A&O x 3 at baseline.      Toxic/Metabolic encephalopathy  RSV infection  HTN / DL  WALI  Sepsis due to RSV infection (Not present on admission)              PLAN:    ·	Tele reviewed by me. Sinus tachycardia  ·	EKG on admission: Sinus tachycardia 110/min (Interpreted by me)  ·	Stroke w/u reviewed. Negative  ·	Neuro eval noted. Recommended brain MRI  ·	Routine EEG is normal  ·	RVP positive for RSV  ·	CXR: NAPD  ·	Start her on Alb/Atrovent neb treatment. Also start her on Advair inhaler 250/50, one puff twice a day  ·	Started her on Prednisone 40 mg po daily  ·	Labs noted. Cr went up 1.2-->1.7. Scan bladder to check PVR  ·	Give her  cc/hr x 1L  ·	Monitor BP. Hold Nifedipine xl if she drops BP  ·	Monitor renal function and electrolytes    Progress Note Handoff    Pending (specify):  Consults_________, Tests________, Test Results_Brain MRI______, Other__Sepsis/RSV_______  Family discussion:  Disposition: Home___/SNF___/Other________/Unknown at this time________    Rell Bolivar MD  Spectra: [phone]

## 2025-01-10 NOTE — PATIENT PROFILE ADULT - FALL HARM RISK - HARM RISK INTERVENTIONS

## 2025-01-11 LAB
ANION GAP SERPL CALC-SCNC: 12 MMOL/L — SIGNIFICANT CHANGE UP (ref 7–14)
BUN SERPL-MCNC: 41 MG/DL — HIGH (ref 10–20)
CALCIUM SERPL-MCNC: 8.1 MG/DL — LOW (ref 8.4–10.5)
CHLORIDE SERPL-SCNC: 98 MMOL/L — SIGNIFICANT CHANGE UP (ref 98–110)
CO2 SERPL-SCNC: 26 MMOL/L — SIGNIFICANT CHANGE UP (ref 17–32)
CREAT SERPL-MCNC: 1.6 MG/DL — HIGH (ref 0.7–1.5)
EGFR: 36 ML/MIN/1.73M2 — LOW
GLUCOSE SERPL-MCNC: 137 MG/DL — HIGH (ref 70–99)
POTASSIUM SERPL-MCNC: 3.4 MMOL/L — LOW (ref 3.5–5)
POTASSIUM SERPL-SCNC: 3.4 MMOL/L — LOW (ref 3.5–5)
SODIUM SERPL-SCNC: 136 MMOL/L — SIGNIFICANT CHANGE UP (ref 135–146)
T PALLIDUM AB TITR SER: NEGATIVE — SIGNIFICANT CHANGE UP

## 2025-01-11 PROCEDURE — 99232 SBSQ HOSP IP/OBS MODERATE 35: CPT

## 2025-01-11 RX ADMIN — ALBUTEROL SULFATE 2.5 MILLIGRAM(S): 90 INHALANT RESPIRATORY (INHALATION) at 20:01

## 2025-01-11 RX ADMIN — ALBUTEROL SULFATE 2.5 MILLIGRAM(S): 90 INHALANT RESPIRATORY (INHALATION) at 08:41

## 2025-01-11 RX ADMIN — LIDOCAINE 1 PATCH: 50 OINTMENT TOPICAL at 06:18

## 2025-01-11 RX ADMIN — LIDOCAINE 1 PATCH: 50 OINTMENT TOPICAL at 06:17

## 2025-01-11 RX ADMIN — LOSARTAN POTASSIUM 100 MILLIGRAM(S): 100 TABLET, FILM COATED ORAL at 05:32

## 2025-01-11 RX ADMIN — Medication 40 MILLIGRAM(S): at 05:32

## 2025-01-11 RX ADMIN — Medication 1 DROP(S): at 14:09

## 2025-01-11 RX ADMIN — NIFEDIPINE 90 MILLIGRAM(S): 60 TABLET, EXTENDED RELEASE ORAL at 05:32

## 2025-01-11 RX ADMIN — Medication 81 MILLIGRAM(S): at 12:24

## 2025-01-11 RX ADMIN — FLUTICASONE PROPIONATE AND SALMETEROL 1 DOSE(S): 50; 500 POWDER ORAL; RESPIRATORY (INHALATION) at 12:27

## 2025-01-11 RX ADMIN — ALBUTEROL SULFATE 2.5 MILLIGRAM(S): 90 INHALANT RESPIRATORY (INHALATION) at 14:47

## 2025-01-11 RX ADMIN — Medication 1 DROP(S): at 05:33

## 2025-01-11 RX ADMIN — PANTOPRAZOLE 40 MILLIGRAM(S): 40 TABLET, DELAYED RELEASE ORAL at 05:32

## 2025-01-11 RX ADMIN — ATORVASTATIN CALCIUM 80 MILLIGRAM(S): 40 TABLET, FILM COATED ORAL at 21:01

## 2025-01-11 RX ADMIN — Medication 1 DROP(S): at 05:34

## 2025-01-11 NOTE — PROGRESS NOTE ADULT - SUBJECTIVE AND OBJECTIVE BOX
Patient is a 64y old  Female who presents with a chief complaint of Altered mental status (10 Daniel 2025 12:39)    HPI:  64-year-old female with PMHx of HTN, HLD, Glaucoma, presents today for altered mental status. Per family patient was found in urine on bathroom floor, last known well was 10 PM prior to going to bed. Patient A&O x 3 at baseline. Today, pt is A&O x2, answer questions slowly and follow commands. Pt complain of headaches and blurry vision. Denies fever, chills, chest pain, dizziness, SOB, abdominal pain, N/V/D.    Imaging:  CT PERFUSION: Artifactual perfusion abnormality in the right frontal region, otherwise  no perfusion deficits to suggest completed infarction or at risk territory.  CTA HEAD/NECK: No large vessel occlusion, aneurysm, or vascular malformation.  Focal mild stenosis of the right A2 JORGE A.  Focal high-grade stenosis in the left P2 PCA.  CXR: No radiographic evidence of acute cardiopulmonary disease.  S/p neurology eval.  Out of the window for IV thrombolytics. NIHSS 1 for non disabling signs not a candidate for IA intervention.  Admitted to medicine for further management.  (09 Jan 2025 12:52)    PAST MEDICAL & SURGICAL HISTORY:  Hypertension, unspecified type  HLD (hyperlipidemia)  Mood disorder    patient seen and examined independently on morning rounds for the first time today, chart reviewed and discussed with the medicine resident and on interdisciplinary rounds:    hospital day #2    overnight tmax 102.6---less tachycardic- awaiting PT/OT for dispo plan    Vital Signs Last 24 Hrs  T(C): 37 (11 Jan 2025 11:55), Max: 37.7 (10 Daniel 2025 18:41)  T(F): 98.6 (11 Jan 2025 11:55), Max: 99.8 (10 Daniel 2025 18:41)  HR: 101 (11 Jan 2025 11:55) (98 - 106)  BP: 111/74 (11 Jan 2025 11:55) (111/74 - 129/78)  BP(mean): 86 (11 Jan 2025 11:55) (79 - 95)  RR: 18 (11 Jan 2025 11:55) (18 - 20)  SpO2: 96% (11 Jan 2025 04:07) (96% - 96%)    Parameters below as of 11 Jan 2025 04:07  Patient On (Oxygen Delivery Method): room air    PE:  GEN-NAD, AAOx3  PULM- fair air entry, trace bilateral exp wheezing  CVS- +s1/s2 tachycardic  GI- soft NT ND +bs, no rebound, no guarding  EXT- no edema                        14.1   8.75  )-----------( 188      ( 10 Daniel 2025 04:34 )             39.6     01-11    136  |  98  |  41[H]  ----------------------------<  137[H]  3.4[L]   |  26  |  1.6[H]    Ca    8.1[L]      11 Jan 2025 04:38  Mg     2.2     01-10    TPro  6.4  /  Alb  3.6  /  TBili  0.4  /  DBili  x   /  AST  13  /  ALT  9   /  AlkPhos  73  01-10    Urinalysis Basic - ( 11 Jan 2025 04:38 )    Color: x / Appearance: x / SG: x / pH: x  Gluc: 137 mg/dL / Ketone: x  / Bili: x / Urobili: x   Blood: x / Protein: x / Nitrite: x   Leuk Esterase: x / RBC: x / WBC x   Sq Epi: x / Non Sq Epi: x / Bacteria: x    Urinalysis with Rflx Culture (collected 09 Jan 2025 07:50)        MEDICATIONS  (STANDING):  albuterol    0.083% 2.5 milliGRAM(s) Nebulizer every 6 hours  aspirin  chewable 81 milliGRAM(s) Oral daily  atorvastatin 80 milliGRAM(s) Oral at bedtime  dorzolamide 2% Ophthalmic Solution 1 Drop(s) Both EYES <User Schedule>  fluticasone propionate/ salmeterol 250-50 MICROgram(s) Diskus 1 Dose(s) Inhalation two times a day  latanoprost 0.005% Ophthalmic Solution 1 Drop(s) Both EYES daily  losartan 100 milliGRAM(s) Oral daily  NIFEdipine XL 90 milliGRAM(s) Oral daily  pantoprazole    Tablet 40 milliGRAM(s) Oral before breakfast  predniSONE   Tablet 40 milliGRAM(s) Oral daily  timolol 0.5% Solution 1 Drop(s) Both EYES <User Schedule>

## 2025-01-11 NOTE — PROGRESS NOTE ADULT - ASSESSMENT
64-year-old female with PMH of HTN, HLD, Glaucoma, presents today for altered mental status. Per family patient was found in urine on bathroom floor, last known well was 10 PM prior to going to bed. Patient A&O x 3 at baseline.      #Toxic/Metabolic encephalopathy  #Sepsis due to RSV infection  #HTN   #WALI  #Hyperlipidemia  #Glaucoma    -continue current management including telemetry for tachycardia---if hr improves with no further events on tele can dc tele in next 24 hrs  -supportive care for RSV---- oral prednisone (40 mg daily x 5 days then stop)---today day #2  -appreciate neuro consult and recomm---plan for outpatient f/u with neuro clinic and continue asa/statin  -monitor o2 sat  -acetaminophen prn for fever (tmax overnight 102.6)  -holding hctz for WALI (cr. 1.7 today)---monitor bmp (repeat in am)  -PT/OT consults  -EKG on admission----sinus tachy  -tsh 0.81/ Hba1c 6.1/   -EEG negative  -continue Advair inhaler 250/50, one puff twice a day  -neurochecks  -MRI-1/10/25--->Equivocal punctate acute infarcts in the left corona radiata and left   parietal white matter. No intracranial hemorrhage or midline shift. Chronic lacunar infarcts in the right thalamus and left basal ganglia, new since the prior MR brain from 4/14/2010. New remote hypertensive   microhemorrhage in the right thalamus. Interval development of numerous microcystic perivascular spaces in bilateral basal ganglia which may be sequelae of chronic hypertension. Incidental empty sella within the enlarged sella turcica which can be seen with idiopathic intracranial hypertension.    DVT/GI ppx    FULL CODE    anticipate possible dc home in next 24-48 hrs--f/u PT/OT---trend creatinine (2/2 WALI)----holding hctz and on short 5 day course prednisone (today day #2)    pcp- Dr. Nash    Total time spent to complete patient's bedside assessment, review medical chart, discuss medical plan of care with covering medical team was more than 35 minutes  with >50% of time spendt face to face with patient, discussion with patient/family and/or coordination of care

## 2025-01-12 LAB
ANION GAP SERPL CALC-SCNC: 14 MMOL/L — SIGNIFICANT CHANGE UP (ref 7–14)
BUN SERPL-MCNC: 47 MG/DL — HIGH (ref 10–20)
CALCIUM SERPL-MCNC: 8.5 MG/DL — SIGNIFICANT CHANGE UP (ref 8.4–10.5)
CHLORIDE SERPL-SCNC: 100 MMOL/L — SIGNIFICANT CHANGE UP (ref 98–110)
CO2 SERPL-SCNC: 26 MMOL/L — SIGNIFICANT CHANGE UP (ref 17–32)
CREAT SERPL-MCNC: 1.5 MG/DL — SIGNIFICANT CHANGE UP (ref 0.7–1.5)
EGFR: 39 ML/MIN/1.73M2 — LOW
GLUCOSE SERPL-MCNC: 146 MG/DL — HIGH (ref 70–99)
POTASSIUM SERPL-MCNC: 4.6 MMOL/L — SIGNIFICANT CHANGE UP (ref 3.5–5)
POTASSIUM SERPL-SCNC: 4.6 MMOL/L — SIGNIFICANT CHANGE UP (ref 3.5–5)
SODIUM SERPL-SCNC: 140 MMOL/L — SIGNIFICANT CHANGE UP (ref 135–146)

## 2025-01-12 PROCEDURE — 99232 SBSQ HOSP IP/OBS MODERATE 35: CPT | Mod: GC

## 2025-01-12 RX ORDER — GUAIFENESIN 100 MG/5ML
600 SYRUP ORAL EVERY 12 HOURS
Refills: 0 | Status: DISCONTINUED | OUTPATIENT
Start: 2025-01-12 | End: 2025-01-13

## 2025-01-12 RX ADMIN — Medication 40 MILLIGRAM(S): at 05:21

## 2025-01-12 RX ADMIN — LATANOPROST 1 DROP(S): 50 SOLUTION OPHTHALMIC at 12:50

## 2025-01-12 RX ADMIN — FLUTICASONE PROPIONATE AND SALMETEROL 1 DOSE(S): 50; 500 POWDER ORAL; RESPIRATORY (INHALATION) at 12:53

## 2025-01-12 RX ADMIN — LOSARTAN POTASSIUM 100 MILLIGRAM(S): 100 TABLET, FILM COATED ORAL at 05:21

## 2025-01-12 RX ADMIN — Medication 1 DROP(S): at 05:22

## 2025-01-12 RX ADMIN — ATORVASTATIN CALCIUM 80 MILLIGRAM(S): 40 TABLET, FILM COATED ORAL at 21:28

## 2025-01-12 RX ADMIN — ACETAMINOPHEN 650 MILLIGRAM(S): 80 SOLUTION/ DROPS ORAL at 02:01

## 2025-01-12 RX ADMIN — Medication 81 MILLIGRAM(S): at 12:50

## 2025-01-12 RX ADMIN — ALBUTEROL SULFATE 2.5 MILLIGRAM(S): 90 INHALANT RESPIRATORY (INHALATION) at 19:35

## 2025-01-12 RX ADMIN — PANTOPRAZOLE 40 MILLIGRAM(S): 40 TABLET, DELAYED RELEASE ORAL at 05:21

## 2025-01-12 RX ADMIN — Medication 1 DROP(S): at 15:02

## 2025-01-12 RX ADMIN — Medication 600 MILLIGRAM(S): at 09:43

## 2025-01-12 RX ADMIN — NIFEDIPINE 90 MILLIGRAM(S): 60 TABLET, EXTENDED RELEASE ORAL at 05:21

## 2025-01-12 RX ADMIN — Medication 1 DROP(S): at 15:03

## 2025-01-12 NOTE — PROGRESS NOTE ADULT - SUBJECTIVE AND OBJECTIVE BOX
SUBJECTIVE/OVERNIGHT EVENTS  Today is hospital day 3d.    MEDICATIONS  STANDING MEDICATIONS  albuterol    0.083% 2.5 milliGRAM(s) Nebulizer every 6 hours  aspirin  chewable 81 milliGRAM(s) Oral daily  atorvastatin 80 milliGRAM(s) Oral at bedtime  dorzolamide 2% Ophthalmic Solution 1 Drop(s) Both EYES <User Schedule>  fluticasone propionate/ salmeterol 250-50 MICROgram(s) Diskus 1 Dose(s) Inhalation two times a day  latanoprost 0.005% Ophthalmic Solution 1 Drop(s) Both EYES daily  losartan 100 milliGRAM(s) Oral daily  NIFEdipine XL 90 milliGRAM(s) Oral daily  pantoprazole    Tablet 40 milliGRAM(s) Oral before breakfast  predniSONE   Tablet 40 milliGRAM(s) Oral daily  timolol 0.5% Solution 1 Drop(s) Both EYES <User Schedule>    PRN MEDICATIONS  acetaminophen     Tablet .. 650 milliGRAM(s) Oral every 6 hours PRN    VITALS  T(F): 98.5 (01-11-25 @ 20:18), Max: 99.6 (01-11-25 @ 04:07)  HR: 93 (01-11-25 @ 20:18) (93 - 101)  BP: 130/80 (01-11-25 @ 20:18) (111/74 - 130/80)  RR: 18 (01-11-25 @ 11:55) (18 - 18)  SpO2: 96% (01-11-25 @ 04:07) (96% - 96%)      LABS             14.1   8.75  )-----------( 188      ( 01-10-25 @ 04:34 )             39.6     136  |  98  |  41  -------------------------<  137   01-11-25 @ 04:38  3.4  |  26  |  1.6    Ca      8.1     01-11-25 @ 04:38  Mg     2.2     01-10-25 @ 04:34    TPro  6.4  /  Alb  3.6  /  TBili  0.4  /  DBili  x   /  AST  13  /  ALT  9   /  AlkPhos  73  /  GGT  x     01-10-25 @ 04:34      Troponin T, High Sensitivity Result: 7 ng/L (01-09-25 @ 06:30)    Urinalysis Basic - ( 11 Jan 2025 04:38 )    Color: x / Appearance: x / SG: x / pH: x  Gluc: 137 mg/dL / Ketone: x  / Bili: x / Urobili: x   Blood: x / Protein: x / Nitrite: x   Leuk Esterase: x / RBC: x / WBC x   Sq Epi: x / Non Sq Epi: x / Bacteria: x          Urinalysis with Rflx Culture (collected 09 Jan 2025 07:50)

## 2025-01-12 NOTE — PROGRESS NOTE ADULT - ATTENDING COMMENTS
Patient seen, note reviewd    CONSTITUTIONAL: NAD, well-developed, well-groomed  EYES: conjunctiva and sclera clear  ENMT: normal  RESPIRATORY: normal respiratory effort; lungs are clear to auscultation bilaterally  CARDIOVASCULAR: S1S2, RRR  ABDOMEN: +BS, NTND  PSYCH: affect appropriate  NEUROLOGY: grossly normal  SKIN: no jaundice    64F with PMH of HTN, HLD, glaucoma, here with TME from sepsis from RSV, on tele for tachycardia. On steroids for RSV, continues to have cough as well. c/w steroids (day 2/5), nebs. Creatinine downtrending slightly, continue to monitor. Agree w above    ______  Dale Grayson MD  Hospital Medicine  [facility]

## 2025-01-12 NOTE — PHYSICAL THERAPY INITIAL EVALUATION ADULT - PERTINENT HX OF CURRENT PROBLEM, REHAB EVAL
64-year-old female with PMHx of HTN, HLD, Glaucoma, presents today for altered mental status. Per family patient was found in urine on bathroom floor, last known well was 10 PM prior to going to bed. Patient A&O x 3 at baseline.

## 2025-01-12 NOTE — PROGRESS NOTE ADULT - ASSESSMENT
A 64-year-old female with a medical history of hypertension, hyperlipidemia, and glaucoma presented with altered mental status. She was found by her family on the bathroom floor, incontinent of urine, and was last known to be well the previous evening at 10 PM. Typically, she is alert and oriented to person, place, and time. In the emergency department, she was slow to answer questions and had mild confusion but essentially returned to her baseline mental status. A stroke code was initiated, but she was outside the window for thrombolytics and had a low NIHSS score, indicating non-disabling signs. Her confusion, headache, and blurred vision raised concerns for possible transient ischemic attack, stroke, seizure, hypertensive encephalopathy, or a metabolic/infectious cause.    **Assessment and Plan:**    # Altered Mental Status  - Stroke workup: CT head and CTA head/neck showed no acute infarct; chronic changes noted.  - Routine EEG and MRI brain without gadolinium recommended to evaluate for seizure activity and structural abnormalities.  MRI: IMPRESSION:  Equivocal punctate acute infarcts in the left corona radiata and left   parietal white matter. No intracranial hemorrhage or midline shift.  Chronic lacunar infarcts in the right thalamus and left basal ganglia,   new since the prior MR brain from 4/14/2010. New remote hypertensive   microhemorrhage in the right thalamus. Interval development of numerous   microcystic perivascular spaces in bilateral basal ganglia which may be   sequelae of chronic hypertension.  Incidental empty sella within the enlarged sella turcica which can be   seen with idiopathic intracranial hypertension.  EEG: No electrographic seizures  - Stroke team re-called on 1/10/25 to notify them of the acute infarct and to request guidance on dual antiplatelet therapy. They are stating aspirin and atorvastatin, not mentioning dual antiplatelet.  - Atorvastatin changed to high dose  - Ensure seizure precautions and keep magnesium levels >2.  - Monitor telemetry, as telemetry monitoring is critical to detect any arrhythmias.  - Evaluate for metabolic and infectious causes with UA, CXR, Flu/RSV/COVID tests, and labs for B12, folate, syphilis, and TSH.  - RSV Positive  - 1/10/25: 1 L LR bolus. Lactate wnl    # Poor air movement, wheeze in setting of positive RSV  - Prednisone 40 daily  - albuterol nebulization  - ADVAIR    # Hypertension  - Nifedipine, Losartan-HCT for now.    # Hyperlipidemia  - Atorvastatin 40 mg daily, as she is not currently on lipid-lowering therapy.    # Prediabetes: A1c: 6.1  - Monitor glucose    # Glaucoma  - Continue home eye drops: Latanoprost and Dorzolamide.   A 64-year-old female with a medical history of hypertension, hyperlipidemia, and glaucoma presented with altered mental status. She was found by her family on the bathroom floor, incontinent of urine, and was last known to be well the previous evening at 10 PM. Typically, she is alert and oriented to person, place, and time. In the emergency department, she was slow to answer questions and had mild confusion but essentially returned to her baseline mental status. A stroke code was initiated, but she was outside the window for thrombolytics and had a low NIHSS score, indicating non-disabling signs. Her confusion, headache, and blurred vision raised concerns for possible transient ischemic attack, stroke, seizure, hypertensive encephalopathy, or a metabolic/infectious cause.    **Assessment and Plan:**    # Altered Mental Status  - Stroke workup: CT head and CTA head/neck showed no acute infarct; chronic changes noted.  - Routine EEG and MRI brain without gadolinium recommended to evaluate for seizure activity and structural abnormalities.  MRI: IMPRESSION:  Equivocal punctate acute infarcts in the left corona radiata and left   parietal white matter. No intracranial hemorrhage or midline shift.  Chronic lacunar infarcts in the right thalamus and left basal ganglia,   new since the prior MR brain from 4/14/2010. New remote hypertensive   microhemorrhage in the right thalamus. Interval development of numerous   microcystic perivascular spaces in bilateral basal ganglia which may be   sequelae of chronic hypertension.  Incidental empty sella within the enlarged sella turcica which can be   seen with idiopathic intracranial hypertension.  EEG: No electrographic seizures  - Stroke team re-called on 1/10/25 to notify them of the acute infarct and to request guidance on dual antiplatelet therapy. They are stating aspirin and atorvastatin, not mentioning dual antiplatelet.  - Atorvastatin changed to high dose  - Ensure seizure precautions and keep magnesium levels >2.  - Monitor telemetry, as telemetry monitoring is critical to detect any arrhythmias.  - Evaluate for metabolic and infectious causes with UA, CXR, Flu/RSV/COVID tests, and labs for B12, folate, syphilis, and TSH.  - RSV Positive  - 1/10/25: 1 L LR bolus. Lactate wnl    # Poor air movement, wheeze in setting of positive RSV  - Prednisone 40 daily  - albuterol nebulization  - ADVAIR    # Hypertension  - Nifedipine, Losartan-HCT for now.  -1/11//25: HCTZ held for creatinine    # Hyperlipidemia  - Atorvastatin 40 mg daily, as she is not currently on lipid-lowering therapy.    # Prediabetes: A1c: 6.1  - Monitor glucose    # Glaucoma  - Continue home eye drops: Latanoprost and Dorzolamide.

## 2025-01-12 NOTE — PHYSICAL THERAPY INITIAL EVALUATION ADULT - GENERAL OBSERVATIONS, REHAB EVAL
PT IE [phone]. Chart reviewed. pt encountered semi-reclined in bed. + IV lock, + primafit, + tele. pt is alert, oriented x 3 and is able to follow commands,

## 2025-01-13 ENCOUNTER — TRANSCRIPTION ENCOUNTER (OUTPATIENT)
Age: 65
End: 2025-01-13

## 2025-01-13 VITALS
TEMPERATURE: 99 F | RESPIRATION RATE: 18 BRPM | HEART RATE: 102 BPM | DIASTOLIC BLOOD PRESSURE: 80 MMHG | SYSTOLIC BLOOD PRESSURE: 126 MMHG

## 2025-01-13 LAB
ANION GAP SERPL CALC-SCNC: 9 MMOL/L — SIGNIFICANT CHANGE UP (ref 7–14)
BUN SERPL-MCNC: 39 MG/DL — HIGH (ref 10–20)
CALCIUM SERPL-MCNC: 8.4 MG/DL — SIGNIFICANT CHANGE UP (ref 8.4–10.5)
CHLORIDE SERPL-SCNC: 101 MMOL/L — SIGNIFICANT CHANGE UP (ref 98–110)
CO2 SERPL-SCNC: 29 MMOL/L — SIGNIFICANT CHANGE UP (ref 17–32)
CREAT SERPL-MCNC: 1.3 MG/DL — SIGNIFICANT CHANGE UP (ref 0.7–1.5)
EGFR: 46 ML/MIN/1.73M2 — LOW
GLUCOSE SERPL-MCNC: 145 MG/DL — HIGH (ref 70–99)
POTASSIUM SERPL-MCNC: 3.9 MMOL/L — SIGNIFICANT CHANGE UP (ref 3.5–5)
POTASSIUM SERPL-SCNC: 3.9 MMOL/L — SIGNIFICANT CHANGE UP (ref 3.5–5)
SODIUM SERPL-SCNC: 139 MMOL/L — SIGNIFICANT CHANGE UP (ref 135–146)

## 2025-01-13 PROCEDURE — 99232 SBSQ HOSP IP/OBS MODERATE 35: CPT

## 2025-01-13 RX ORDER — NIFEDIPINE 60 MG/1
1 TABLET, EXTENDED RELEASE ORAL
Qty: 0 | Refills: 0 | DISCHARGE
Start: 2025-01-13

## 2025-01-13 RX ORDER — ATORVASTATIN CALCIUM 40 MG/1
1 TABLET, FILM COATED ORAL
Qty: 30 | Refills: 0
Start: 2025-01-13 | End: 2025-02-11

## 2025-01-13 RX ORDER — PANTOPRAZOLE 40 MG/1
1 TABLET, DELAYED RELEASE ORAL
Qty: 6 | Refills: 0
Start: 2025-01-13 | End: 2025-01-18

## 2025-01-13 RX ORDER — PREDNISONE 5 MG
1 TABLET ORAL
Qty: 6 | Refills: 0
Start: 2025-01-13

## 2025-01-13 RX ORDER — ALBUTEROL SULFATE 90 UG/1
2 INHALANT RESPIRATORY (INHALATION)
Qty: 1 | Refills: 0
Start: 2025-01-13

## 2025-01-13 RX ORDER — HYDROCHLOROTHIAZIDE 25 MG/1
1 TABLET ORAL
Refills: 0 | DISCHARGE

## 2025-01-13 RX ORDER — LOSARTAN POTASSIUM 100 MG/1
1 TABLET, FILM COATED ORAL
Qty: 0 | Refills: 0 | DISCHARGE
Start: 2025-01-13

## 2025-01-13 RX ORDER — FLUTICASONE PROPIONATE AND SALMETEROL 50; 500 UG/1; UG/1
1 POWDER ORAL; RESPIRATORY (INHALATION)
Qty: 1 | Refills: 0
Start: 2025-01-13

## 2025-01-13 RX ORDER — ASPIRIN 81 MG
1 TABLET, DELAYED RELEASE (ENTERIC COATED) ORAL
Qty: 30 | Refills: 0
Start: 2025-01-13 | End: 2025-02-11

## 2025-01-13 RX ORDER — POTASSIUM CHLORIDE 600 MG/1
40 TABLET, FILM COATED, EXTENDED RELEASE ORAL ONCE
Refills: 0 | Status: COMPLETED | OUTPATIENT
Start: 2025-01-13 | End: 2025-01-13

## 2025-01-13 RX ADMIN — LOSARTAN POTASSIUM 100 MILLIGRAM(S): 100 TABLET, FILM COATED ORAL at 05:19

## 2025-01-13 RX ADMIN — Medication 1 DROP(S): at 05:20

## 2025-01-13 RX ADMIN — LATANOPROST 1 DROP(S): 50 SOLUTION OPHTHALMIC at 11:44

## 2025-01-13 RX ADMIN — PANTOPRAZOLE 40 MILLIGRAM(S): 40 TABLET, DELAYED RELEASE ORAL at 05:19

## 2025-01-13 RX ADMIN — Medication 1 DROP(S): at 12:05

## 2025-01-13 RX ADMIN — Medication 81 MILLIGRAM(S): at 11:44

## 2025-01-13 RX ADMIN — FLUTICASONE PROPIONATE AND SALMETEROL 1 DOSE(S): 50; 500 POWDER ORAL; RESPIRATORY (INHALATION) at 11:43

## 2025-01-13 RX ADMIN — NIFEDIPINE 90 MILLIGRAM(S): 60 TABLET, EXTENDED RELEASE ORAL at 05:19

## 2025-01-13 RX ADMIN — Medication 1 DROP(S): at 13:29

## 2025-01-13 RX ADMIN — Medication 40 MILLIGRAM(S): at 05:19

## 2025-01-13 RX ADMIN — POTASSIUM CHLORIDE 40 MILLIEQUIVALENT(S): 600 TABLET, FILM COATED, EXTENDED RELEASE ORAL at 08:22

## 2025-01-13 RX ADMIN — Medication 600 MILLIGRAM(S): at 05:19

## 2025-01-13 NOTE — OCCUPATIONAL THERAPY INITIAL EVALUATION ADULT - BED MOBILITY/TRANSFERS, PREVIOUS LEVEL OF FUNCTION, OT EVAL
+regular flat bed +used rollator or sc at times, otherwise furniture walk inside the apt/independent

## 2025-01-13 NOTE — DISCHARGE NOTE NURSING/CASE MANAGEMENT/SOCIAL WORK - FINANCIAL ASSISTANCE
[facility] provides services at a reduced cost to those who are determined to be eligible through [facility]’s financial assistance program. Information regarding [facility]’s financial assistance program can be found by going to [email or web address] or by calling [phone].

## 2025-01-13 NOTE — OCCUPATIONAL THERAPY INITIAL EVALUATION ADULT - LIVES WITH, PROFILE
Pt lives with son in an elevator apt on 6th floor with no steps to enter. +bath tub with 1 grab bar, no shower chair/children

## 2025-01-13 NOTE — DISCHARGE NOTE PROVIDER - CARE PROVIDER_API CALL
Edson Amos  Neurology  [address] [place] [address], [address]  [place] [address]  Phone: [phone]  Fax: [phone]  Follow Up Time: 1 week    FANY NELSON  [address] [place] [address]  Phone: [phone]  Fax: [phone]  Established Patient  Follow Up Time: 1 week

## 2025-01-13 NOTE — OCCUPATIONAL THERAPY INITIAL EVALUATION ADULT - VISUAL ACUITY
+wears reading glasses only +stated slight blurry when see something in distance +no double vision reported +has glaucoma

## 2025-01-13 NOTE — DISCHARGE NOTE PROVIDER - HOSPITAL COURSE
A 64-year-old female with a medical history of hypertension, hyperlipidemia, and glaucoma presented with altered mental status. She was found by her family on the bathroom floor, incontinent of urine, and was last known to be well the previous evening at 10 PM. Typically, she is alert and oriented to person, place, and time. In the emergency department, she was slow to answer questions and had mild confusion but essentially returned to her baseline mental status. A stroke code was initiated, but she was outside the window for thrombolytics and had a low NIHSS score, indicating non-disabling signs. Her confusion, headache, and blurred vision raised concerns for possible transient ischemic attack, stroke, seizure, hypertensive encephalopathy, or a metabolic/infectious cause.    **Assessment and Plan:**    # Altered Mental Status  - Stroke workup: CT head and CTA head/neck showed no acute infarct; chronic changes noted.  - Routine EEG and MRI brain without gadolinium recommended to evaluate for seizure activity and structural abnormalities.  MRI: IMPRESSION:  Equivocal punctate acute infarcts in the left corona radiata and left   parietal white matter. No intracranial hemorrhage or midline shift.  Chronic lacunar infarcts in the right thalamus and left basal ganglia,   new since the prior MR brain from 4/14/2010. New remote hypertensive   microhemorrhage in the right thalamus. Interval development of numerous   microcystic perivascular spaces in bilateral basal ganglia which may be   sequelae of chronic hypertension.  Incidental empty sella within the enlarged sella turcica which can be   seen with idiopathic intracranial hypertension.  EEG: No electrographic seizures  - Stroke team re-called on 1/10/25 to notify them of the acute infarct and to request guidance on dual antiplatelet therapy. They are stating aspirin and atorvastatin, not mentioning dual antiplatelet.  - Atorvastatin changed to high dose  - Ensure seizure precautions and keep magnesium levels >2.  - Monitor telemetry, as telemetry monitoring is critical to detect any arrhythmias.  - Evaluate for metabolic and infectious causes with UA, CXR, Flu/RSV/COVID tests, and labs for B12, folate, syphilis, and TSH.  - RSV Positive  - 1/10/25: 1 L LR bolus. Lactate wnl    # Poor air movement, wheeze in setting of positive RSV --- improving  - Continue Prednisone 40 daily for now  - albuterol nebulization  - ADVAIR    # Hypertension  - Nifedipine, Losartan-HCT for now.  -1/11//25: HCTZ held for creatinine  1/13/25: Creatinine now improving, so resuming on DC    # Hyperlipidemia  - Atorvastatin 40 mg daily, as she is not currently on lipid-lowering therapy.    # Prediabetes: A1c: 6.1  - Monitor glucose    # Glaucoma  - Continue home eye drops: Latanoprost and Dorzolamide.    Discharge and med approved by attending Dr. Bolivar on 1/13/25. A 64-year-old female with a medical history of hypertension, hyperlipidemia, and glaucoma presented with altered mental status. She was found by her family on the bathroom floor, incontinent of urine, and was last known to be well the previous evening at 10 PM. Typically, she is alert and oriented to person, place, and time. In the emergency department, she was slow to answer questions and had mild confusion but essentially returned to her baseline mental status. A stroke code was initiated, but she was outside the window for thrombolytics and had a low NIHSS score, indicating non-disabling signs. Her confusion, headache, and blurred vision raised concerns for possible transient ischemic attack, stroke, seizure, hypertensive encephalopathy, or a metabolic/infectious cause.    **Assessment and Plan:**    # Altered Mental Status  - Stroke workup: CT head and CTA head/neck showed no acute infarct; chronic changes noted.  - Routine EEG and MRI brain without gadolinium recommended to evaluate for seizure activity and structural abnormalities.  MRI: IMPRESSION:  Equivocal punctate acute infarcts in the left corona radiata and left   parietal white matter. No intracranial hemorrhage or midline shift.  Chronic lacunar infarcts in the right thalamus and left basal ganglia,   new since the prior MR brain from 4/14/2010. New remote hypertensive   microhemorrhage in the right thalamus. Interval development of numerous   microcystic perivascular spaces in bilateral basal ganglia which may be   sequelae of chronic hypertension.  Incidental empty sella within the enlarged sella turcica which can be   seen with idiopathic intracranial hypertension.  EEG: No electrographic seizures  - Stroke team re-called on 1/10/25 to notify them of the acute infarct and to request guidance on dual antiplatelet therapy. They are stating aspirin and atorvastatin, not mentioning dual antiplatelet.  - Atorvastatin changed to high dose  - Ensure seizure precautions and keep magnesium levels >2.  - Monitor telemetry, as telemetry monitoring is critical to detect any arrhythmias.  - Evaluate for metabolic and infectious causes with UA, CXR, Flu/RSV/COVID tests, and labs for B12, folate, syphilis, and TSH.  - RSV Positive  - 1/10/25: 1 L LR bolus. Lactate wnl    # Poor air movement, wheeze in setting of positive RSV --- improving  - Continue Prednisone 40 daily for now  - albuterol nebulization  - ADVAIR    # Hypertension  - Nifedipine, Losartan-HCT for now.  -1/11//25: HCTZ held for creatinine  1/13/25: Creatinine now improving, but holding HCTZ even on discharge because of soft BP.    # Hyperlipidemia  - Atorvastatin 40 mg daily, as she is not currently on lipid-lowering therapy.    # Prediabetes: A1c: 6.1  - Monitor glucose    # Glaucoma  - Continue home eye drops: Latanoprost and Dorzolamide.    Discharge and med approved by attending Dr. Bolivar on 1/13/25.

## 2025-01-13 NOTE — PROGRESS NOTE ADULT - ASSESSMENT
64-year-old female with PMH of HTN, HLD, Glaucoma, presents today for altered mental status. Per family patient was found in urine on bathroom floor, last known well was 10 PM prior to going to bed. Patient A&O x 3 at baseline.      Toxic/Metabolic encephalopathy  L corona radiate acute punctate infarcts.   RSV infection  HTN / DL  WALI  Sepsis due to RSV infection (Not present on admission)              PLAN:    ·	Tele reviewed by me. No events  ·	EKG on admission: Sinus tachycardia 110/min (Interpreted by me)  ·	Stroke w/u on admission reviewed. Negative  ·	Neuro eval noted. Recommended brain MRI  ·	Equivocal punctate acute infarcts in the left corona radiata and left parietal white matter. New remote hypertensive micro hemorrhage in the right thalamus.  ·	Routine EEG is normal  ·	Neuro f/u noted. Recommended to cont ASA and Lipitor 80 mg po qhs and f/u as an out pt.   ·	RVP positive for RSV  ·	CXR: NAPD  ·	Cont Advair inhaler 250/50, one puff twice a day  ·	Taper off Prednisone  ·	Cr is back to baseline  ·	D/C home      * Med rec reviewed. Plan of care d/w the pt. Time spent 40 minutes.

## 2025-01-13 NOTE — PROGRESS NOTE ADULT - SUBJECTIVE AND OBJECTIVE BOX
BRAYDEN MARTINO  64y Female    CHIEF COMPLAINT:    Patient is a 64y old  Female who presents with a chief complaint of Altered mental status (13 Jan 2025 10:25)      INTERVAL HPI/OVERNIGHT EVENTS:    Patient seen and examined. Feels better now. No new complaint    ROS: All other systems are negative.    Vital Signs:    T(F): 99.5 (01-13-25 @ 05:29), Max: 99.5 (01-13-25 @ 05:29)  HR: 91 (01-13-25 @ 05:29) (90 - 91)  BP: 117/77 (01-13-25 @ 05:29) (117/77 - 126/82)  RR: 18 (01-13-25 @ 05:29) (18 - 18)  SpO2: 97% (01-13-25 @ 05:29) (97% - 97%)  I&O's Summary    12 Jan 2025 07:01  -  13 Jan 2025 07:00  --------------------------------------------------------  IN: 952 mL / OUT: 625 mL / NET: 327 mL      Daily     Daily   CAPILLARY BLOOD GLUCOSE          PHYSICAL EXAM:    GENERAL:  NAD  SKIN: No rashes or lesions  HENT: Atraumatic. Normocephalic. PERRL. Moist membranes.  NECK: Supple, No JVD. No lymphadenopathy.  PULMONARY: CTA B/L. No wheezing. No rales  CVS: Normal S1, S2. Rate and Rhythm are regular. No murmurs.  ABDOMEN/GI: Soft, Nontender, Nondistended; BS present  EXTREMITIES: Peripheral pulses intact. No edema B/L LE.  NEUROLOGIC:  No motor or sensory deficit.  PSYCH: Alert & oriented x 3    Consultant(s) Notes Reviewed:  [x ] YES  [ ] NO  Care Discussed with Consultants/Other Providers [ x] YES  [ ] NO    EKG reviewed  Telemetry reviewed    LABS:    01-13    139  |  101  |  39[H]  ----------------------------<  145[H]    Creatinine Trend: 1.3<--, 1.5<--, 1.6<--, 1.7<--, 1.2<--  3.9   |  29  |  1.3    Ca    8.4      13 Jan 2025 04:42                RADIOLOGY & ADDITIONAL TESTS:      < from: MR Head No Cont (01.10.25 @ 11:10) >    IMPRESSION:    Equivocal punctate acute infarcts in the left corona radiata and left   parietal white matter. No intracranial hemorrhage or midline shift.    Chronic lacunar infarcts in the right thalamus and left basal ganglia,   new since the prior MR brain from 4/14/2010. New remote hypertensive   microhemorrhage in the right thalamus. Interval development of numerous   microcystic perivascular spaces in bilateral basal ganglia which may be   sequelae of chronic hypertension.    Incidental empty sella within the enlarged sella turcica which can be   seen with idiopathic intracranial hypertension.    < end of copied text >  Imaging or report Personally Reviewed:  [x ] YES  [ ] NO    Medications:  Standing  albuterol    0.083% 2.5 milliGRAM(s) Nebulizer every 6 hours  aspirin  chewable 81 milliGRAM(s) Oral daily  atorvastatin 80 milliGRAM(s) Oral at bedtime  dorzolamide 2% Ophthalmic Solution 1 Drop(s) Both EYES <User Schedule>  fluticasone propionate/ salmeterol 250-50 MICROgram(s) Diskus 1 Dose(s) Inhalation two times a day  guaiFENesin  milliGRAM(s) Oral every 12 hours  latanoprost 0.005% Ophthalmic Solution 1 Drop(s) Both EYES daily  losartan 100 milliGRAM(s) Oral daily  NIFEdipine XL 90 milliGRAM(s) Oral daily  pantoprazole    Tablet 40 milliGRAM(s) Oral before breakfast  predniSONE   Tablet 40 milliGRAM(s) Oral daily  timolol 0.5% Solution 1 Drop(s) Both EYES <User Schedule>    PRN Meds  acetaminophen     Tablet .. 650 milliGRAM(s) Oral every 6 hours PRN      Case discussed with resident    Care discussed with pt/family

## 2025-01-13 NOTE — DISCHARGE NOTE NURSING/CASE MANAGEMENT/SOCIAL WORK - PATIENT PORTAL LINK FT
You can access the FollowMyHealth Patient Portal offered by [facility] by registering at the following website: [email or web address]. By joining [facility]’s FollowMyHealth portal, you will also be able to view your health information using other applications (apps) compatible with our system.

## 2025-01-13 NOTE — OCCUPATIONAL THERAPY INITIAL EVALUATION ADULT - RANGE OF MOTION EXAMINATION, UPPER EXTREMITY
except both shoulders AROM ~1/2 range and AAROM WFL/bilateral UE Active ROM was WFL  (within functional limits)

## 2025-01-13 NOTE — PROGRESS NOTE ADULT - ASSESSMENT
A 64-year-old female with a medical history of hypertension, hyperlipidemia, and glaucoma presented with altered mental status. She was found by her family on the bathroom floor, incontinent of urine, and was last known to be well the previous evening at 10 PM. Typically, she is alert and oriented to person, place, and time. In the emergency department, she was slow to answer questions and had mild confusion but essentially returned to her baseline mental status. A stroke code was initiated, but she was outside the window for thrombolytics and had a low NIHSS score, indicating non-disabling signs. Her confusion, headache, and blurred vision raised concerns for possible transient ischemic attack, stroke, seizure, hypertensive encephalopathy, or a metabolic/infectious cause.    **Assessment and Plan:**    # Altered Mental Status  - Stroke workup: CT head and CTA head/neck showed no acute infarct; chronic changes noted.  - Routine EEG and MRI brain without gadolinium recommended to evaluate for seizure activity and structural abnormalities.  MRI: IMPRESSION:  Equivocal punctate acute infarcts in the left corona radiata and left   parietal white matter. No intracranial hemorrhage or midline shift.  Chronic lacunar infarcts in the right thalamus and left basal ganglia,   new since the prior MR brain from 4/14/2010. New remote hypertensive   microhemorrhage in the right thalamus. Interval development of numerous   microcystic perivascular spaces in bilateral basal ganglia which may be   sequelae of chronic hypertension.  Incidental empty sella within the enlarged sella turcica which can be   seen with idiopathic intracranial hypertension.  EEG: No electrographic seizures  - Stroke team re-called on 1/10/25 to notify them of the acute infarct and to request guidance on dual antiplatelet therapy. They are stating aspirin and atorvastatin, not mentioning dual antiplatelet.  - Atorvastatin changed to high dose  - Ensure seizure precautions and keep magnesium levels >2.  - Monitor telemetry, as telemetry monitoring is critical to detect any arrhythmias.  - Evaluate for metabolic and infectious causes with UA, CXR, Flu/RSV/COVID tests, and labs for B12, folate, syphilis, and TSH.  - RSV Positive  - 1/10/25: 1 L LR bolus. Lactate wnl    # Poor air movement, wheeze in setting of positive RSV --- improving  - Continue Prednisone 40 daily for now  - albuterol nebulization  - ADVAIR    # Hypertension  - Nifedipine, Losartan-HCT for now.  -1/11//25: HCTZ held for creatinine    # Hyperlipidemia  - Atorvastatin 40 mg daily, as she is not currently on lipid-lowering therapy.    # Prediabetes: A1c: 6.1  - Monitor glucose    # Glaucoma  - Continue home eye drops: Latanoprost and Dorzolamide.

## 2025-01-13 NOTE — OCCUPATIONAL THERAPY INITIAL EVALUATION ADULT - PERTINENT HX OF CURRENT PROBLEM, REHAB EVAL
Pt is a 64-year-old female with PMHx of HTN, HLD, Glaucoma, presents today for altered mental status. Per family patient was found in urine on bathroom floor, last known well was 10 PM prior to going to bed. Patient A&O x 3 at baseline. Today, pt is A&O x2, answer questions slowly and follow commands. Pt complain of headaches and blurry vision. Denies fever, chills, chest pain, dizziness, SOB, abdominal pain, N/V/D.

## 2025-01-13 NOTE — OCCUPATIONAL THERAPY INITIAL EVALUATION ADULT - GENERAL OBSERVATIONS, REHAB EVAL
Pt encountered semi chan in bed in NAD +son at bedside +IV locked +tele +primafit. Pt agreed to OT eval with no reported any discomfort throughout. Pt left seated at b/s chair in NAD +chair alarm, CINDY sofia.

## 2025-01-13 NOTE — PROGRESS NOTE ADULT - SUBJECTIVE AND OBJECTIVE BOX
SUBJECTIVE/OVERNIGHT EVENTS  Today is hospital day 4d. This morning patient was seen and examined at bedside, resting comfortably in bed. No acute or major events overnight.    MEDICATIONS  STANDING MEDICATIONS  albuterol    0.083% 2.5 milliGRAM(s) Nebulizer every 6 hours  aspirin  chewable 81 milliGRAM(s) Oral daily  atorvastatin 80 milliGRAM(s) Oral at bedtime  dorzolamide 2% Ophthalmic Solution 1 Drop(s) Both EYES <User Schedule>  fluticasone propionate/ salmeterol 250-50 MICROgram(s) Diskus 1 Dose(s) Inhalation two times a day  guaiFENesin  milliGRAM(s) Oral every 12 hours  latanoprost 0.005% Ophthalmic Solution 1 Drop(s) Both EYES daily  losartan 100 milliGRAM(s) Oral daily  NIFEdipine XL 90 milliGRAM(s) Oral daily  pantoprazole    Tablet 40 milliGRAM(s) Oral before breakfast  predniSONE   Tablet 40 milliGRAM(s) Oral daily  timolol 0.5% Solution 1 Drop(s) Both EYES <User Schedule>    PRN MEDICATIONS  acetaminophen     Tablet .. 650 milliGRAM(s) Oral every 6 hours PRN    VITALS  T(F): 99.5 (01-13-25 @ 05:29), Max: 99.5 (01-13-25 @ 05:29)  HR: 91 (01-13-25 @ 05:29) (90 - 105)  BP: 117/77 (01-13-25 @ 05:29) (108/73 - 126/82)  RR: 18 (01-13-25 @ 05:29) (18 - 18)  SpO2: 97% (01-13-25 @ 05:29) (97% - 97%)    PHYSICAL EXAM  GENERAL  ( x ) NAD, lying in bed comfortably     (  ) obtunded     (  ) lethargic     (  ) somnolent    HEAD  (x  ) Atraumatic     (  ) hematoma     (  ) laceration (specify location:       )     HEART  Rate -->  (  x) normal rate    (  ) bradycardic    (  ) tachycardic  Rhythm -->  (  ) regular    (  ) regularly irregular    (  ) irregularly irregular  Murmurs -->  (  ) normal s1/s2    (  ) systolic murmur    (  ) diastolic murmur    (  ) continuous murmur     (  ) S3 present    (  ) S4 present    LUNGS  (x  )Unlabored respirations     (  ) tachypnea  (x  ) B/L air entry     (  ) decreased breath sounds in:  (location     )    (  ) no adventitious sound     (  ) crackles     (  ) wheezing      (  ) rhonchi      (specify location:       )  (  ) chest wall tenderness (specify location:       )  Air movement improved since last exam    ABDOMEN  (x  ) Soft     (  ) tense   |   (  ) nondistended     (  ) distended   |   (  ) +BS     (  ) hypoactive bowel sounds     (  ) hyperactive bowel sounds  (  x) nontender     (  ) RUQ tenderness     (  ) RLQ tenderness     (  ) LLQ tenderness     (  ) epigastric tenderness     (  ) diffuse tenderness  (  ) Splenomegaly      (  ) Hepatomegaly      (  ) Jaundice     (  ) ecchymosis     EXTREMITIES  (x  ) Normal     (  ) Rash     (  ) ecchymosis     (  ) varicose veins      (  ) pitting edema     (  ) non-pitting edema   (  ) ulceration     (  ) gangrene:     (location:     )    NERVOUS SYSTEM  ( x ) A&Ox3     (  ) confused     (  ) lethargic  CN II-XII:     (  ) Intact     (  ) focal deficits  (Specify:     )   Upper extremities:     (  ) strength X/5     (  ) focal deficit (specify:    )  Lower extremities:     (  ) strength  X/5    (  ) focal deficit (specify:    )      LABS    139  |  101  |  39  -------------------------<  145   01-13-25 @ 04:42  3.9  |  29  |  1.3    Ca      8.4     01-13-25 @ 04:42          Urinalysis Basic - ( 13 Jan 2025 04:42 )    Color: x / Appearance: x / SG: x / pH: x  Gluc: 145 mg/dL / Ketone: x  / Bili: x / Urobili: x   Blood: x / Protein: x / Nitrite: x   Leuk Esterase: x / RBC: x / WBC x   Sq Epi: x / Non Sq Epi: x / Bacteria: x

## 2025-01-13 NOTE — OCCUPATIONAL THERAPY INITIAL EVALUATION ADULT - ADL RETRAINING, OT EVAL
Pt will perform UB dressing task with supervision by dc. Pt will perform LB dressing task with using AEs CGA by dc.

## 2025-01-13 NOTE — DISCHARGE NOTE PROVIDER - PROVIDER TOKENS
PROVIDER:[TOKEN:[[id number]],FOLLOWUP:[1 week]],PROVIDER:[TOKEN:[[id number]],FOLLOWUP:[1 week],ESTABLISHEDPATIENT:[T]]

## 2025-01-13 NOTE — DISCHARGE NOTE PROVIDER - NSDCCPCAREPLAN_GEN_ALL_CORE_FT
PRINCIPAL DISCHARGE DIAGNOSIS  Diagnosis: Acute ischemic stroke  Assessment and Plan of Treatment: During your recent hospital stay, you were evaluated for altered mental status after being found by your family on the bathroom floor. You experienced confusion, headache, and blurred vision, which prompted a thorough medical evaluation. Although a stroke code was initiated, imaging showed no acute stroke, but chronic changes were noted. An MRI revealed potential small acute infarcts in your brain. No seizures were detected on the EEG. You were started on high-dose Atorvastatin and continue aspirin to manage your stroke risk. Tests confirmed an RSV infection, for which you received treatment with Prednisone and inhalers.  Your hypertension is being managed with Nifedipine and Losartan, while a temporary hold on your HCTZ was recommended in the hospital due to kidney function worsening during the hospital stay, but now improving. You are now taking Atorvastatin for hyperlipidemia and should monitor your glucose levels due to prediabetes. Your glaucoma medications should be continued as prescribed. Please follow up with your primary care physician and any specialists as advised. It is crucial that you return for immediate medical care if you experience any new or worsening symptoms such as sudden weakness, difficulty speaking, severe headaches, vision changes, chest pain, difficulty breathing, or unusual confusion, as these may indicate a serious medical condition.      SECONDARY DISCHARGE DIAGNOSES  Diagnosis: Acute URI due to respiratory syncytial virus (RSV)  Assessment and Plan of Treatment:     Diagnosis: Altered mental status  Assessment and Plan of Treatment:

## 2025-01-13 NOTE — CHART NOTE - NSCHARTNOTEFT_GEN_A_CORE
The patient is a 64-year-old female with a complex medical history, including hypertension, hyperlipidemia, glaucoma, and recent episodes of altered mental status with probable small acute infarcts. These health issues have impacted her mobility, balance, and strength.    Physical therapy evaluation revealed that the patient requires a minimum assist for most activities, including bed mobility, transfers, and ambulation. Specifically, her lower extremity strength was rated at 3+/5, and she has demonstrated impaired balance and decreased strength, contributing to her moderate fall risk, as indicated by a Tinetti Balance Test score of 22/28.    A rolling walker is medically necessary to facilitate safe ambulation and transfers, enabling the patient to weight-bear as tolerated and use minimal assistance effectively. This assistive device will address her mobility impairments, including decreased weight-shifting ability, short step length, and balance challenges during turns, thereby reducing her risk of falls and enhancing her ability to perform self-care and home management tasks.    Without the rolling walker, the patient’s risk of falls is significantly increased, which could lead to further injury and hospitalization. The rolling walker will provide the necessary support for her rehabilitation program, which includes balance training, strengthening, and gait training, with the ultimate goal of achieving improved functional independence.    In summary, a rolling walker is medically necessary for this patient.

## 2025-01-13 NOTE — OCCUPATIONAL THERAPY INITIAL EVALUATION ADULT - SHORT TERM MEMORY, REHAB EVAL
able to repeat 3/3 words immediately; able to recall 2/3 words with no cues and 1/3 word with 2 options provided, post ~7-10 mins, minimal impairment/impaired

## 2025-01-13 NOTE — DISCHARGE NOTE PROVIDER - NSDCMRMEDTOKEN_GEN_ALL_CORE_FT
Albuterol (Eqv-ProAir HFA) 90 mcg/inh inhalation aerosol: 2 puff(s) inhaled every 4 hours as needed for  shortness of breath and/or wheezing  aspirin 81 mg oral tablet, chewable: 1 tab(s) orally once a day  atorvastatin 80 mg oral tablet: 1 tab(s) orally once a day (at bedtime)  dorzolamide 2% ophthalmic solution: 1 drop(s) to each affected eye 2 times a day  dorzolamide-timolol 2.23%-0.68% (2%-0.5% base) ophthalmic solution: 1 drop(s) in each eye 3 times a day  fluticasone-salmeterol 250 mcg-50 mcg inhalation powder: 1 inhaled 2 times a day  hydroCHLOROthiazide 25 mg oral tablet: 1 tab(s) orally once a day  latanoprost 0.005% ophthalmic solution: 1 drop(s) in each eye once a day  losartan 100 mg oral tablet: 1 tab(s) orally once a day  NIFEdipine 90 mg oral tablet, extended release: 1 tab(s) orally once a day  pantoprazole 40 mg oral delayed release tablet: 1 tab(s) orally once a day (before a meal)  predniSONE 20 mg oral tablet: 1 tab(s) orally once a day take 1 tablet in the morning (after breakfast) for 3 days then half a tablet in the morning (after breakfast) for 3 days then stop   Albuterol (Eqv-ProAir HFA) 90 mcg/inh inhalation aerosol: 2 puff(s) inhaled every 4 hours as needed for  shortness of breath and/or wheezing  aspirin 81 mg oral tablet, chewable: 1 tab(s) orally once a day  atorvastatin 80 mg oral tablet: 1 tab(s) orally once a day (at bedtime)  dorzolamide 2% ophthalmic solution: 1 drop(s) to each affected eye 2 times a day  dorzolamide-timolol 2.23%-0.68% (2%-0.5% base) ophthalmic solution: 1 drop(s) in each eye 3 times a day  fluticasone-salmeterol 250 mcg-50 mcg inhalation powder: 1 inhaled 2 times a day  latanoprost 0.005% ophthalmic solution: 1 drop(s) in each eye once a day  losartan 100 mg oral tablet: 1 tab(s) orally once a day  NIFEdipine 90 mg oral tablet, extended release: 1 tab(s) orally once a day  pantoprazole 40 mg oral delayed release tablet: 1 tab(s) orally once a day (before a meal)  predniSONE 20 mg oral tablet: 1 tab(s) orally once a day take 1 tablet in the morning (after breakfast) for 3 days then half a tablet in the morning (after breakfast) for 3 days then stop

## 2025-01-13 NOTE — DISCHARGE NOTE PROVIDER - NSDCFUADDINST_GEN_ALL_CORE_FT
Please follow up with your stroke doctor (Dr. Amos) and your primary care doctor after you are discharged from the hospital.

## 2025-01-13 NOTE — OCCUPATIONAL THERAPY INITIAL EVALUATION ADULT - LEVEL OF INDEPENDENCE: DRESS LOWER BODY, OT EVAL
Detail Level: Generalized
Quality 226: Preventive Care And Screening: Tobacco Use: Screening And Cessation Intervention: Patient screened for tobacco use and is an ex/non-smoker
Quality 130: Documentation Of Current Medications In The Medical Record: Current Medications Documented
Quality 431: Preventive Care And Screening: Unhealthy Alcohol Use - Screening: Patient screened for unhealthy alcohol use using a single question and scores less than 2 times per year
except footwear(baseline, son assisted in footwear)/minimum assist (75% patients effort)

## 2025-01-13 NOTE — DISCHARGE NOTE PROVIDER - CARE PROVIDERS DIRECT ADDRESSES
[email or web address]

## 2025-01-19 DIAGNOSIS — J06.9 ACUTE UPPER RESPIRATORY INFECTION, UNSPECIFIED: ICD-10-CM

## 2025-01-19 DIAGNOSIS — B97.4 RESPIRATORY SYNCYTIAL VIRUS AS THE CAUSE OF DISEASES CLASSIFIED ELSEWHERE: ICD-10-CM

## 2025-01-19 DIAGNOSIS — E78.5 HYPERLIPIDEMIA, UNSPECIFIED: ICD-10-CM

## 2025-01-19 DIAGNOSIS — H40.9 UNSPECIFIED GLAUCOMA: ICD-10-CM

## 2025-01-19 DIAGNOSIS — I10 ESSENTIAL (PRIMARY) HYPERTENSION: ICD-10-CM

## 2025-01-19 DIAGNOSIS — A41.9 SEPSIS, UNSPECIFIED ORGANISM: ICD-10-CM

## 2025-01-19 DIAGNOSIS — I63.81 OTHER CEREBRAL INFARCTION DUE TO OCCLUSION OR STENOSIS OF SMALL ARTERY: ICD-10-CM

## 2025-01-19 DIAGNOSIS — G92.8 OTHER TOXIC ENCEPHALOPATHY: ICD-10-CM

## 2025-01-19 DIAGNOSIS — R29.701 NIHSS SCORE 1: ICD-10-CM

## 2025-01-19 DIAGNOSIS — N17.9 ACUTE KIDNEY FAILURE, UNSPECIFIED: ICD-10-CM

## 2025-01-19 DIAGNOSIS — E11.9 TYPE 2 DIABETES MELLITUS WITHOUT COMPLICATIONS: ICD-10-CM

## 2025-01-22 ENCOUNTER — TRANSCRIPTION ENCOUNTER (OUTPATIENT)
Age: 65
End: 2025-01-22

## 2025-03-24 ENCOUNTER — APPOINTMENT (OUTPATIENT)
Dept: NEUROLOGY | Facility: CLINIC | Age: 65
End: 2025-03-24

## 2025-05-19 NOTE — H&P ADULT - NSHPPOADEEPVENOUSTHROMB_GEN_A_CORE
no DISPLAY PLAN FREE TEXT DISPLAY PLAN FREE TEXT DISPLAY PLAN FREE TEXT DISPLAY PLAN FREE TEXT DISPLAY PLAN FREE TEXT DISPLAY PLAN FREE TEXT DISPLAY PLAN FREE TEXT

## 2025-06-02 ENCOUNTER — APPOINTMENT (OUTPATIENT)
Dept: OBGYN | Facility: CLINIC | Age: 65
End: 2025-06-02